# Patient Record
Sex: MALE | Race: WHITE | Employment: UNEMPLOYED | URBAN - METROPOLITAN AREA
[De-identification: names, ages, dates, MRNs, and addresses within clinical notes are randomized per-mention and may not be internally consistent; named-entity substitution may affect disease eponyms.]

---
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
## 2023-02-03 ENCOUNTER — TELEPHONE (OUTPATIENT)
Dept: ONCOLOGY | Facility: CLINIC | Age: 18
End: 2023-02-03
Payer: COMMERCIAL

## 2023-02-03 NOTE — TELEPHONE ENCOUNTER
Meka calling in to report that pt has not felt well in the past few days. She notes that he has complained of occasional dizziness, leg pain, and overall fatigue.    She states that it is not an urgent issue but she is wondering if he should have labs done sometime in the next week or so. Pt was last seen by Dr. Collier on 12/1/22 for polycythemia.    Pt currently taking aspirin 81 mg daily, has not tolerated phlebotomy in the past. Pt has had similar leg pains in the past per Meka. US was done on 11/2022 and was negative for DVT.    Pt denies fevers, shortness of breath, bleeding, nausea/vomiting, or chest pain.    Will route to provider for recommendations.

## 2023-02-03 NOTE — TELEPHONE ENCOUNTER
Writer called Meka to inform her of recommendations from Eugenio Lewis, PA  You; Asia Patrick PAYAN; America Henry, PARVIN 6 minutes ago (3:45 PM)     AW  Lets do labs and see JOSY early next week. Either Asia or JOANNE. Thanks       Meka stated that she is not currently with pt, and she would want to talk to him and her  before making the apts.    Meka to call clinic back to make both lab and provider apt for early next week.

## 2023-03-21 NOTE — PROGRESS NOTES
"Pediatric Endocrinology Follow-up Consultation: Diabetes    Patient: Gadiel James MRN# [id number]   YOB: 2005 Age: 17 year old   Date of Visit: 3/28/2023    Dear Rajesh Faustin (Inactive)    I had the pleasure of seeing your patient, Gadiel James in the Pediatric Endocrinology Clinic, [facility], on 3/28/2023 for a follow-up consultation of T2DM.  Gadiel was last seen in our clinic on 9/27/22.        Problem list:     Patient Active Problem List    Diagnosis Date Noted     Polycythemia 11/03/2022     Priority: Medium     Type 2 diabetes mellitus with complication, without long-term current use of insulin (H) 01/07/2019     Priority: Medium     [place]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 10/21/2016     Priority: Medium     Major depressive disorder, single episode, moderate (H) 06/17/2016     Priority: Medium     Attention deficit hyperactivity disorder (ADHD), combined type 06/16/2016     Priority: Medium     Back pain 03/09/2015     Priority: Medium     Pain in joint involving ankle and foot 03/09/2015     Priority: Medium     Severe obesity due to excess calories without serious comorbidity with body mass index (BMI) greater than 99th percentile for age in pediatric patient (H) 09/24/2014     Priority: Medium     Developmental reading disorder 06/09/2014     Priority: Medium     Anxiety 11/11/2011     Priority: Medium     Active autistic disorder 08/17/2010     Priority: Medium            HPI:   History was obtained from patient, patient's mother (because patient is unable to provide a complete history themselves) and electronic health record.     Gadiel, \"Kavin,\" is a 17 year old male diagnosed with type 2 diabetes on 12/28/2018.  Gadiel also has an extensive health history which includes: autism, ADHD, polycythemia, hypertriglyceridemia, low HDL, elevated transaminases, elevated LFT's and depression.   Gadiel is " accompanied by both parents today and returns for a follow-up after having last been seen by me on September 27, 2022.  At the conclusion of the last visit, the plan was to continue with diabetes medication dosing. Kavin reports that diabetes management has been going well, but he just came down with cold-like symptoms today. He denies any hospital visits. He denies any polyuria or polydipsia.    Mom reports that Kavin was seen by the hematology team in November 2022 and the juardiance was subsequently stopped due to a 3-4% risk of elevated hematocrit levels. Mom reports that fasting glucose levels have been as follows:  2/19 - 181   2/20 - 142   2/23 - 173   2/24 - 156   3/4 - 145   3/12 - 254, 175 (he was ill at the time)   3/13 - 164   3/18 - 168   3/26 - 136     We reviewed the following additional history at today's visit:  None    Today's concerns include: None    Blood Glucose Trends Recognized (Independent interpretation of glucose data): Persistent elevations in the fasting state.    Diet: Gadiel has no dietary restrictions.    Exercise: ad erika    I reviewed new history from the patient and the medical record.  I have reviewed previous lab results and records, patient BMI and the growth chart at today's visit.  I have reviewed the pump and sensor downloads today.    Blood Glucose Data:    As noted above    A1c:     Today s hemoglobin A1c: Not completed today.  Hemoglobin A1C   Date Value Ref Range Status   07/11/2022 7.2 (H) 0.0 - 5.6 % Final     Comment:     Normal <5.7%   Prediabetes 5.7-6.4%    Diabetes 6.5% or higher     Note: Adopted from ADA consensus guidelines.   06/28/2022 7.3 (A) 0.0 - 5.7 % Final      Result was discussed at today's visit.     Hemoglobin A1C   Date Value Ref Range Status   07/11/2022 7.2 (H) 0.0 - 5.6 % Final     Comment:     Normal <5.7%   Prediabetes 5.7-6.4%    Diabetes 6.5% or higher     Note: Adopted from ADA consensus guidelines.   06/28/2022 7.3 (A) 0.0 - 5.7 % Final    12/14/2021 7.0 (A) 0.0 - 5.7 % Final   09/07/2021 6.0 (H) 0.0 - 5.6 % Final     Comment:     Normal <5.7%   Prediabetes 5.7-6.4%    Diabetes 6.5% or higher     Note: Adopted from ADA consensus guidelines.   01/18/2021 8.6 (H) 0 - 5.6 % Final     Comment:     Results confirmed by repeat test  Normal <5.7% Prediabetes 5.7-6.4%  Diabetes 6.5% or higher - adopted from ADA   consensus guidelines.       Hemoglobin A1C POCT   Date Value Ref Range Status   09/27/2022 7.6 4.3 - <5.7 % Final       Current diabetes regimen:   Ozempic 2 mg once weekly  Metformin ER 2 pills at dinner            Social History:     Social History     Social History Narrative    7/21/2020: Kavin lives with his parents. He's an only child. He's going into 11th grade in the fall. He likes [employer], gardening, cooking, and video games. 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         11/17/2020: Kavin lives with his parents. He's in 11th grade and is doing online schooling now due to the COVID-19 pandemic. He is not happy with it. He's getting a 3 D printer and wants a camping oven top for [employer].         1/19/2021: Kavin lives with his parents in [place], [place]. He's in 11th grade (online schooling due to the COVID-19 pandemic, although he will be going back to clinic next week). He got his 3 D printer ([employer]).        3/23/21: Kavin is adopted, so family history is unknown. He is in the 11th grade for the 2156-4676 school year. He has been enjoying his 3D printer.         12/14/21: Kavin is living at home with adoptive parents.        2/8/22: Kavin is homebound for school at this time.        3/28/23: Kavin is home schooled            Family History:     Family History   Adopted: Yes   Problem Relation Age of Onset     Family History Negative Mother      Family History Negative Father        Family history was reviewed and is unchanged. Refer to the initial note.         Allergies:     Allergies   Allergen Reactions     Ritalin [Methylphenidate Hcl] Other (See Comments)      hallucinations             Medications:     Current Outpatient Medications   Medication Sig Dispense Refill     blood glucose (ACCU-CHEK GUIDE) test strip Use to test blood sugar 5 times daily or as directed. 150 each 6     blood glucose monitoring (ACCU-CHEK FASTCLIX) lancets Use to test blood sugar 3 times daily or as directed. 102 each 3     busPIRone (BUSPAR) 10 MG tablet Take 10 mg by mouth daily       escitalopram (LEXAPRO) 20 MG tablet Take 20 mg by mouth daily        guanFACINE HCl (INTUNIV) 3 MG TB24 24 hr tablet Take 3 mg by mouth At Bedtime        LATUDA 80 MG TABS tablet        metFORMIN (GLUCOPHAGE-XR) 750 MG 24 hr tablet Take 2 tablets (1,500 mg) by mouth daily (with dinner) 60 tablet 6     Pediatric Multivitamins-Iron (CHILDRENS MULTI VITAMINS/IRON PO) Take 1 tablet by mouth daily        risperiDONE (RISPERDAL) 1 MG tablet Take 1 mg by mouth 2 times daily        Semaglutide, 2 MG/DOSE, (OZEMPIC, 2 MG/DOSE,) 8 MG/3ML pen Inject 2 mg Subcutaneous every 7 days 3 mL 6     vitamin D3 (CHOLECALCIFEROL) 50 mcg (2000 units) tablet Take 1 tablet by mouth daily       Semaglutide, 1 MG/DOSE, 4 MG/3ML SOPN Inject 1 mg Subcutaneous every 7 days (Patient not taking: Reported on 3/28/2023) 3 mL 6     traZODone (DESYREL) 50 MG tablet Take  mg by mouth At Bedtime  (Patient not taking: Reported on 11/3/2022)               Review of Systems:     A comprehensive review of systems was performed and was negative, unless otherwise stated in HPI above.         Physical Exam:   There were no vitals taken for this visit.  Blood pressure percentiles are not available for patients who are 18 years or older.  Height: Data Unavailable, No height on file for this encounter.  Weight: 0 lbs 0 oz, No weight on file for this encounter.  BMI: There is no height or weight on file to calculate BMI., No height and weight on file for this encounter.      CONSTITUTIONAL:   Awake, alert, and in no apparent distress.  HEAD:  Normocephalic, without obvious abnormality.  EYES: Lids and lashes normal, sclera clear, conjunctiva normal.  LUNGS: No increased work of breathin  NEUROLOGIC: No focal deficits noted.   PSYCHIATRIC: Cooperative, no agitation.  MUSCULOSKELETAL:  Full range of motion noted.  Motor strength and tone are normal.         Diabetes Health Maintenance:   Date of Diabetes Diagnosis:  12/28/2018      Antibodies done (yes/no):    If Yes, Antibody Results: No results found for: INAB, IA2ABY, IA2A, GLTA, ISCAB, [id number], [id number], INSABRIA  Special Notes (if any):     Dates of Episodes DKA (month/year, cumulative excluding diagnosis, ongoing, assess each visit): None  Dates of Episodes Severe* Hypoglycemia (month/year, cumulative, ongoing, assess each visit): None   *Severe=patient unconscious, seizure, unable to help self    Date Last Saw Dietitian:     Date Last Eye Exam: 8/2020  Patient Report or Letter?  Report  Location of Eye Exam:   Date Last Flu Shot (or refused): 10/2020    Date Last Annual Lab Studies:   IgA Deficient (yes/no, date screened): No results found for: IGA  Celiac Screen (annual): No results found for: TTG  Thyroid (every 2 years):   TSH   Date Value Ref Range Status   07/11/2022 2.05 0.40 - 4.00 mU/L Final   01/18/2021 2.61 0.40 - 4.00 mU/L Final     T4 Free   Date Value Ref Range Status   01/18/2021 1.04 0.76 - 1.46 ng/dL Final     Free T4   Date Value Ref Range Status   07/11/2022 1.02 0.76 - 1.46 ng/dL Final     Lipids (every 5 years age 10 and older):   Cholesterol   Date Value Ref Range Status   07/11/2022 172 (H) <170 mg/dL Final   01/18/2021 167 <170 mg/dL Final     Triglycerides   Date Value Ref Range Status   07/11/2022 390 (H) <90 mg/dL Final   01/18/2021 427 (H) <90 mg/dL Final     Comment:     Borderline high:   mg/dl  High:            >129 mg/dl  Fasting specimen       HDL Cholesterol   Date Value Ref Range Status   01/18/2021 38 (L) >45 mg/dL Final     Comment:     Low:             <40  mg/dl  Borderline low:   40-45 mg/dl       Direct Measure HDL   Date Value Ref Range Status   07/11/2022 34 (L) >=40 mg/dL Final     LDL Cholesterol Calculated   Date Value Ref Range Status   07/11/2022 60 <=110 mg/dL Final   01/18/2021  <110 mg/dL Final    Cannot estimate LDL when triglyceride exceeds 400 mg/dL     Cholesterol/HDL Ratio   Date Value Ref Range Status   03/07/2015 2.4 0.0 - 5.0 Final     Non HDL Cholesterol   Date Value Ref Range Status   07/11/2022 138 (H) <120 mg/dL Final   01/18/2021 129 (H) <120 mg/dL Final     Comment:     Borderline high:  120-144 mg/dl  High:            >144 mg/dl       Urine Microalbumin (annual): No results found for: MICROALB, CREATCONC, MICROALBUMIN    Missed days of school related to diabetes concerns (illness, hypoglycemia, parental worry since last visit due to DM, excluding routine medical visits): None    Mental Health:    Today's PHQ-2 Mental Health Survey Score (every visit age 10 and older depression screening):  PHQ-2 Score:     PHQ-2 ( 1999 Pfizer) 3/28/2023 8/20/2019   Q1: Little interest or pleasure in doing things 0 1   Q2: Feeling down, depressed or hopeless 1 1   PHQ-2 Score 1 2   PHQ-2 Total Score (12-17 Years)- Positive if 3 or more points; Administer PHQ-A if positive - 2        PHQ-9 score:    PHQ 12/14/2021   PHQ-9 Total Score 7   Q9: Thoughts of better off dead/self-harm past 2 weeks Several days   PHQ-A Total Score -   PHQ-A Depressed most days in past year -   PHQ-A Mood affect on daily activities -   PHQ-A Suicide Ideation past 2 weeks -   PHQ-A Suicide Ideation past month -   PHQ-A Previous suicide attempt -             Laboratory results:     Albumin Urine mg/L   Date Value Ref Range Status   09/07/2021 6 mg/L Final          Lab on 11/20/2022   Component Date Value Ref Range Status     WBC Count 11/20/2022 7.4  4.0 - 11.0 10e3/uL Final     RBC Count 11/20/2022 5.58  4.40 - 5.90 10e6/uL Final     Hemoglobin 11/20/2022 17.7  13.3 - 17.7 g/dL Final      Hematocrit 11/20/2022 50.6  40.0 - 53.0 % Final     MCV 11/20/2022 91  78 - 100 fL Final     MCH 11/20/2022 31.7  26.5 - 33.0 pg Final     MCHC 11/20/2022 35.0  31.5 - 36.5 g/dL Final     RDW 11/20/2022 12.3  10.0 - 15.0 % Final     Platelet Count 11/20/2022 266  150 - 450 10e3/uL Final     % Neutrophils 11/20/2022 39  % Final     % Lymphocytes 11/20/2022 45  % Final     % Monocytes 11/20/2022 7  % Final     % Eosinophils 11/20/2022 8  % Final     % Basophils 11/20/2022 1  % Final     % Immature Granulocytes 11/20/2022 0  % Final     NRBCs per 100 WBC 11/20/2022 0  <1 /100 Final     Absolute Neutrophils 11/20/2022 2.9  1.6 - 8.3 10e3/uL Final     Absolute Lymphocytes 11/20/2022 3.3  0.8 - 5.3 10e3/uL Final     Absolute Monocytes 11/20/2022 0.5  0.0 - 1.3 10e3/uL Final     Absolute Eosinophils 11/20/2022 0.6  0.0 - 0.7 10e3/uL Final     Absolute Basophils 11/20/2022 0.0  0.0 - 0.2 10e3/uL Final     Absolute Immature Granulocytes 11/20/2022 0.0  <=0.4 10e3/uL Final     Absolute NRBCs 11/20/2022 0.0  10e3/uL Final   Oncology Visit on 11/03/2022   Component Date Value Ref Range Status     Sodium 11/03/2022 137  136 - 145 mmol/L Final     Potassium 11/03/2022 4.2  3.4 - 5.3 mmol/L Final     Chloride 11/03/2022 102  98 - 107 mmol/L Final     Carbon Dioxide (CO2) 11/03/2022 22  22 - 29 mmol/L Final     Anion Gap 11/03/2022 13  7 - 15 mmol/L Final     Urea Nitrogen 11/03/2022 10.1  6.0 - 20.0 mg/dL Final     Creatinine 11/03/2022 0.61 (L)  0.67 - 1.17 mg/dL Final     Calcium 11/03/2022 9.6  8.6 - 10.0 mg/dL Final     Glucose 11/03/2022 162 (H)  70 - 99 mg/dL Final     Alkaline Phosphatase 11/03/2022 95  40 - 129 U/L Final     AST 11/03/2022 65 (H)  10 - 50 U/L Final     ALT 11/03/2022 106 (H)  10 - 50 U/L Final     Protein Total 11/03/2022 7.3  6.4 - 8.3 g/dL Final     Albumin 11/03/2022 4.5  3.5 - 5.2 g/dL Final     Bilirubin Total 11/03/2022 0.6  <=1.2 mg/dL Final     GFR Estimate 11/03/2022 >90  >60 mL/min/1.73m2  Final    Effective December 21, 2021 eGFRcr in adults is calculated using the 2021 CKD-EPI creatinine equation which includes age and gender (Maik et al., NEJM, DOI: 10.1056/FKSRrh3854271)     Ferritin 11/03/2022 317  31 - 409 ng/mL Final     Iron 11/03/2022 171 (H)  61 - 157 ug/dL Final     Iron Sat Index 11/03/2022 46  15 - 46 % Final     Iron Binding Capacity 11/03/2022 372  240 - 430 ug/dL Final     Lactate Dehydrogenase 11/03/2022 154  0 - 250 U/L Final     Interpretation 11/03/2022    Final                    Value:This result contains rich text formatting which cannot be displayed here.     Significant Results 11/03/2022    Final                    Value:This result contains rich text formatting which cannot be displayed here.     Test Details 11/03/2022    Final                    Value:This result contains rich text formatting which cannot be displayed here.     Specimen Description 11/03/2022    Final                    Value:This result contains rich text formatting which cannot be displayed here.     Final Diagnosis 11/03/2022    Final                    Value:This result contains rich text formatting which cannot be displayed here.     Comment 11/03/2022    Final                    Value:This result contains rich text formatting which cannot be displayed here.     Clinical Information 11/03/2022    Final                    Value:This result contains rich text formatting which cannot be displayed here.     Peripheral Smear 11/03/2022    Final                    Value:This result contains rich text formatting which cannot be displayed here.     Peripheral Hematologic Data 11/03/2022    Final                    Value:This result contains rich text formatting which cannot be displayed here.     Performing Labs 11/03/2022    Final                    Value:This result contains rich text formatting which cannot be displayed here.     WBC Count 11/03/2022 7.5  4.0 - 11.0 10e3/uL Final     RBC Count 11/03/2022  5.82  4.40 - 5.90 10e6/uL Final     Hemoglobin 11/03/2022 18.0 (H)  13.3 - 17.7 g/dL Final     Hematocrit 11/03/2022 52.6  40.0 - 53.0 % Final     MCV 11/03/2022 90  78 - 100 fL Final     MCH 11/03/2022 30.9  26.5 - 33.0 pg Final     MCHC 11/03/2022 34.2  31.5 - 36.5 g/dL Final     RDW 11/03/2022 12.6  10.0 - 15.0 % Final     Platelet Count 11/03/2022 262  150 - 450 10e3/uL Final     % Neutrophils 11/03/2022 44  % Final     % Lymphocytes 11/03/2022 42  % Final     % Monocytes 11/03/2022 7  % Final     % Eosinophils 11/03/2022 6  % Final     % Basophils 11/03/2022 1  % Final     % Immature Granulocytes 11/03/2022 0  % Final     NRBCs per 100 WBC 11/03/2022 0  <1 /100 Final     Absolute Neutrophils 11/03/2022 3.3  1.6 - 8.3 10e3/uL Final     Absolute Lymphocytes 11/03/2022 3.1  0.8 - 5.3 10e3/uL Final     Absolute Monocytes 11/03/2022 0.5  0.0 - 1.3 10e3/uL Final     Absolute Eosinophils 11/03/2022 0.4  0.0 - 0.7 10e3/uL Final     Absolute Basophils 11/03/2022 0.1  0.0 - 0.2 10e3/uL Final     Absolute Immature Granulocytes 11/03/2022 0.0  <=0.4 10e3/uL Final     Absolute NRBCs 11/03/2022 0.0  10e3/uL Final     % Reticulocyte 11/03/2022 1.6  0.5 - 2.0 % Final     Absolute Reticulocyte 11/03/2022 0.094  0.025 - 0.095 10e6/uL Final     Erythropoietin 11/03/2022 12  4 - 27 mU/mL Final    Comment: INTERPRETIVE INFORMATION: Erythropoietin  Normal serum concentrations of erythropoietin for 95% of   individuals with normal hematocrits range from 4-27 mU/mL.    As the hematocrit is lowered by iron deficiency, aplastic,   or hemolytic anemia, the concentration of erythropoietin   increases as shown in the graph below. In the absence of   anemia, elevated concentrations are seen in renal tumors,   as a manifestation of renal transplant rejection, and in   secondary polycythemia. Low values may be observed in   hemochromatosis.          Expected Erythropoietin Concentrations in Patients                     with Uncomplicated  Anemia       Erythropoietin (mU/mL)                100,000 - +                          +                 10,000 - +.......                          + .......                  1,000 - +    .......                          +     ........                    100 - +       ........                          +        ........                     10 - +                                     ........                          +---+---+---+---+---+---+                         10   20  30  40  50  60  70                                (Hematocrit %)              (Contributions To Nephrology 1988:66:54-62)    Decreased erythropoietin concentrations with an elevated   hematocrit are observed in patients with polycythemia rubra   vera, and with a decreased hematocrit in patients with HIV   infection who are receiving AZT.  Patients on AZT who have   anemia and erythropoietin concentrations of less than or   equal to 500 mU/mL may benefit from therapy with   recombinant EPO ([facility] [id number],1990).  Performed By: [facility]  [address] [place] [id number]  : Myke Foster MD, PhD   Office Visit on 09/27/2022   Component Date Value Ref Range Status     Hemoglobin A1C POCT 09/27/2022 7.6  4.3 - <5.7 % Final   Lab on 09/13/2022   Component Date Value Ref Range Status     CK 09/13/2022 49  39 - 308 U/L Final     Vitamin B12 09/13/2022 683  232 - 1,245 pg/mL Final     Sodium 09/13/2022 138  136 - 145 mmol/L Final     Potassium 09/13/2022 3.8  3.4 - 5.3 mmol/L Final     Chloride 09/13/2022 102  98 - 107 mmol/L Final     Carbon Dioxide (CO2) 09/13/2022 20 (L)  22 - 29 mmol/L Final     Anion Gap 09/13/2022 16 (H)  7 - 15 mmol/L Final     Urea Nitrogen 09/13/2022 7.7  6.0 - 20.0 mg/dL Final     Creatinine 09/13/2022 0.68  0.67 - 1.17 mg/dL Final     Calcium 09/13/2022 9.5  8.6 - 10.0 mg/dL Final     Glucose 09/13/2022 226 (H)  70 - 99 mg/dL Final     Alkaline Phosphatase 09/13/2022 100  40 - 574  U/L Final     AST 09/13/2022 49  10 - 50 U/L Final     ALT 09/13/2022 95 (H)  10 - 50 U/L Final     Protein Total 09/13/2022 7.9  6.4 - 8.3 g/dL Final     Albumin 09/13/2022 4.7  3.5 - 5.2 g/dL Final     Bilirubin Total 09/13/2022 0.6  <=1.2 mg/dL Final     GFR Estimate 09/13/2022 >90  >60 mL/min/1.73m2 Final    Effective December 21, 2021 eGFRcr in adults is calculated using the 2021 CKD-EPI creatinine equation which includes age and gender (Maik et al., NE, DOI: 10.1056/BLSThn6279396)     WBC Count 09/13/2022 7.0  4.0 - 11.0 10e3/uL Final     RBC Count 09/13/2022 6.22 (H)  4.40 - 5.90 10e6/uL Final     Hemoglobin 09/13/2022 19.4 (H)  13.3 - 17.7 g/dL Final     Hematocrit 09/13/2022 57.5 (H)  40.0 - 53.0 % Final     MCV 09/13/2022 92  78 - 100 fL Final     MCH 09/13/2022 31.2  26.5 - 33.0 pg Final     MCHC 09/13/2022 33.7  31.5 - 36.5 g/dL Final     RDW 09/13/2022 12.4  10.0 - 15.0 % Final     Platelet Count 09/13/2022 288  150 - 450 10e3/uL Final     % Neutrophils 09/13/2022 47  % Final     % Lymphocytes 09/13/2022 38  % Final     % Monocytes 09/13/2022 7  % Final     % Eosinophils 09/13/2022 7  % Final     % Basophils 09/13/2022 1  % Final     % Immature Granulocytes 09/13/2022 0  % Final     NRBCs per 100 WBC 09/13/2022 0  <1 /100 Final     Absolute Neutrophils 09/13/2022 3.3  1.6 - 8.3 10e3/uL Final     Absolute Lymphocytes 09/13/2022 2.6  0.8 - 5.3 10e3/uL Final     Absolute Monocytes 09/13/2022 0.5  0.0 - 1.3 10e3/uL Final     Absolute Eosinophils 09/13/2022 0.5  0.0 - 0.7 10e3/uL Final     Absolute Basophils 09/13/2022 0.1  0.0 - 0.2 10e3/uL Final     Absolute Immature Granulocytes 09/13/2022 0.0  <=0.4 10e3/uL Final     Absolute NRBCs 09/13/2022 0.0  10e3/uL Final   Lab on 07/11/2022   Component Date Value Ref Range Status     Bilirubin Total 07/11/2022 0.5  0.2 - 1.3 mg/dL Final     Bilirubin Direct 07/11/2022 0.1  0.0 - 0.2 mg/dL Final     Protein Total 07/11/2022 8.2  6.8 - 8.8 g/dL Final     Albumin  07/11/2022 4.3  3.4 - 5.0 g/dL Final     Alkaline Phosphatase 07/11/2022 95  65 - 260 U/L Final     AST 07/11/2022 48 (H)  0 - 35 U/L Final     ALT 07/11/2022 115 (H)  0 - 50 U/L Final     TSH 07/11/2022 2.05  0.40 - 4.00 mU/L Final     Free T4 07/11/2022 1.02  0.76 - 1.46 ng/dL Final     Cholesterol 07/11/2022 172 (H)  <170 mg/dL Final     Triglycerides 07/11/2022 390 (H)  <90 mg/dL Final     Direct Measure HDL 07/11/2022 34 (L)  >=40 mg/dL Final     LDL Cholesterol Calculated 07/11/2022 60  <=110 mg/dL Final     Non HDL Cholesterol 07/11/2022 138 (H)  <120 mg/dL Final     Patient Fasting > 8hrs? 07/11/2022 Yes   Final     Sodium 07/11/2022 135  133 - 144 mmol/L Final     Potassium 07/11/2022 4.1  3.4 - 5.3 mmol/L Final     Chloride 07/11/2022 108  98 - 110 mmol/L Final     Carbon Dioxide (CO2) 07/11/2022 18 (L)  20 - 32 mmol/L Final     Anion Gap 07/11/2022 9  3 - 14 mmol/L Final     Urea Nitrogen 07/11/2022 10  7 - 21 mg/dL Final     Creatinine 07/11/2022 0.52  0.50 - 1.00 mg/dL Final     Calcium 07/11/2022 9.0  8.5 - 10.1 mg/dL Final    Calcium results for 1-18 y reported between 07/11/2021 and 1/27/2022 were evaluated against an outdated reference interval of 9.1-10.3 mg/dL rather than the intended reference interval of 8.5-10.1 mg/dL which is more representative of our healthy pediatric population. The calcium value itself was accurate but may not have been flagged correctly due to the outdated reference interval.     Glucose 07/11/2022 151 (H)  70 - 99 mg/dL Final     GFR Estimate 07/11/2022 >90  >60 mL/min/1.73m2 Final    Effective December 21, 2021 eGFRcr in adults is calculated using the 2021 CKD-EPI creatinine equation which includes age and gender (Maik et al., NEJM, DOI: 10.1056/HVUEgd7465647)     Vitamin D, Total (25-Hydroxy) 07/11/2022 37  20 - 75 ug/L Final     Ferritin 07/11/2022 156  26 - 388 ng/mL Final     Hemoglobin A1C 07/11/2022 7.2 (H)  0.0 - 5.6 % Final    Normal <5.7%   Prediabetes  5.7-6.4%    Diabetes 6.5% or higher     Note: Adopted from ADA consensus guidelines.     WBC Count 07/11/2022 6.3  4.0 - 11.0 10e3/uL Final     RBC Count 07/11/2022 5.99 (H)  4.40 - 5.90 10e6/uL Final     Hemoglobin 07/11/2022 18.4 (H)  13.3 - 17.7 g/dL Final     Hematocrit 07/11/2022 53.4 (H)  40.0 - 53.0 % Final     MCV 07/11/2022 89  78 - 100 fL Final     MCH 07/11/2022 30.7  26.5 - 33.0 pg Final     MCHC 07/11/2022 34.5  31.5 - 36.5 g/dL Final     RDW 07/11/2022 12.8  10.0 - 15.0 % Final     Platelet Count 07/11/2022 286  150 - 450 10e3/uL Final     % Neutrophils 07/11/2022 46  % Final     % Lymphocytes 07/11/2022 39  % Final     % Monocytes 07/11/2022 8  % Final     % Eosinophils 07/11/2022 6  % Final     % Basophils 07/11/2022 1  % Final     % Immature Granulocytes 07/11/2022 0  % Final     NRBCs per 100 WBC 07/11/2022 0  <1 /100 Final     Absolute Neutrophils 07/11/2022 3.0  1.6 - 8.3 10e3/uL Final     Absolute Lymphocytes 07/11/2022 2.5  0.8 - 5.3 10e3/uL Final     Absolute Monocytes 07/11/2022 0.5  0.0 - 1.3 10e3/uL Final     Absolute Eosinophils 07/11/2022 0.4  0.0 - 0.7 10e3/uL Final     Absolute Basophils 07/11/2022 0.0  0.0 - 0.2 10e3/uL Final     Absolute Immature Granulocytes 07/11/2022 0.0  <=0.4 10e3/uL Final     Absolute NRBCs 07/11/2022 0.0  10e3/uL Final   Office Visit on 06/28/2022   Component Date Value Ref Range Status     Hemoglobin A1C 06/28/2022 7.3 (A)  0.0 - 5.7 % Final            Assessment and Plan:   Gadiel is a 17 year old male with Type 2 diabetes mellitus.  Glucose control is not at goal as evidenced by hyperglycemia occurring 100% of the time during the fasting state. We reviewed glucose target goals upon waking (<126). I've asked parents to obtain additional glucose information - pre and post dinner to better assess need for insulin at this time. No medication changes at this time. Please refer to patient instructions for plan.    Diabetes Screening:  Thyroid (every 2  years): Due   Lipids (every 5 years age 10 and older): Due   Urine Microalbumin (annual): Due     Patient Instructions     It was nice to see you in clinic today.    -Please check some pre-dinner and 2 hours post-dinner fingerstick glucose levels  -Please call the educators with results  -Hgb A1c to be completed  -Continue with Ozempic 2 mg once weekly  -Continue with Metformin ER 2 pills at dinner once daily   -Dr. Sood's team to reach out regarding a T2DM research study    Hemoglobin A1c  American Diabetes Association Goal A1c is <7.5%.   Your Most Recent A1c was:  Hemoglobin A1C   Date Value Ref Range Status   2022 7.2 (H) 0.0 - 5.6 % Final     Comment:     Normal <5.7%   Prediabetes 5.7-6.4%    Diabetes 6.5% or higher     Note: Adopted from ADA consensus guidelines.   2022 7.3 (A) 0.0 - 5.7 % Final               Scheduling:    Pediatric Call Center [phone], option 1.    After Hours Emergency:  [phone].  Ask for the on-call doctor for pediatric endocrinology to be paged.    Diabetes nurses can be reached at [phone].  Main Office: [phone]  Fax: [phone]    Medication renewal requests must be faxed to the main office by your pharmacy.  Allow 3-4 days for completion.              Diabetes is a complicated and dangerous illness which requires intensive monitoring and treatment to prevent both short-term and long-term consequences to various organs. Inadequate management has an increased potential for serious long term effects on various organs, thus patients require intensive monitoring of therapy for safety and efficacy. While insulin therapy is life-saving, it is also associated with risks, such as life-threatening toxicity (hypoglycemia). Careful and continuous attention to balancing glucose levels, activity, diet and insul dosage is necessary.       Thank you for allowing me to participate in the care of your patient.  Please do not hesitate to call  with questions or concerns.      Sincerely,  Nida Angel, MSN, CPNP, [facility]  Pediatric Nurse Practitioner  [facility]  Pediatric Enocrinology    CC      Copy to patient  Meka Rossi (CO GUARDIAN TO 8-) Landon Smith (CO GUARDIAN TO 8-)  [address] [place] [address]    Parents and child completed the video visit from home and the provider completed from the clinic.  Start of video visit: 1:42PM and End of visit: 1:53PM     I spent a total of 30 minutes on the date of the encounter in chart review, patient visit and documentation. Please see the note for further information on patient assessment and treatment.

## 2023-03-28 ENCOUNTER — VIRTUAL VISIT (OUTPATIENT)
Dept: PEDIATRICS | Facility: CLINIC | Age: 18
End: 2023-03-28
Attending: NURSE PRACTITIONER
Payer: COMMERCIAL

## 2023-03-28 VITALS — HEIGHT: 74 IN | BODY MASS INDEX: 34.78 KG/M2 | WEIGHT: 271 LBS

## 2023-03-28 DIAGNOSIS — E11.9 TYPE 2 DIABETES MELLITUS WITHOUT COMPLICATION, WITHOUT LONG-TERM CURRENT USE OF INSULIN (H): Primary | ICD-10-CM

## 2023-03-28 DIAGNOSIS — F84.0 ACTIVE AUTISTIC DISORDER: ICD-10-CM

## 2023-03-28 DIAGNOSIS — E66.01 SEVERE OBESITY DUE TO EXCESS CALORIES WITHOUT SERIOUS COMORBIDITY WITH BODY MASS INDEX (BMI) GREATER THAN 99TH PERCENTILE FOR AGE IN PEDIATRIC PATIENT (H): ICD-10-CM

## 2023-03-28 PROCEDURE — 99214 OFFICE O/P EST MOD 30 MIN: CPT | Mod: VID | Performed by: NURSE PRACTITIONER

## 2023-03-28 RX ORDER — METFORMIN HYDROCHLORIDE 750 MG/1
1500 TABLET, EXTENDED RELEASE ORAL
Qty: 60 TABLET | Refills: 6 | Status: SHIPPED | OUTPATIENT
Start: 2023-03-28 | End: 2023-05-09

## 2023-03-28 RX ORDER — SEMAGLUTIDE 2.68 MG/ML
2 INJECTION, SOLUTION SUBCUTANEOUS
Qty: 3 ML | Refills: 6 | Status: SHIPPED | OUTPATIENT
Start: 2023-03-28 | End: 2023-10-24

## 2023-03-28 ASSESSMENT — PAIN SCALES - GENERAL: PAINLEVEL: NO PAIN (0)

## 2023-03-28 NOTE — PATIENT INSTRUCTIONS
It was nice to see you in clinic today.    -Please check some pre-dinner and 2 hours post-dinner fingerstick glucose levels  -Please call the educators with results  -Hgb A1c to be completed  -Continue with Ozempic 2 mg once weekly  -Continue with Metformin ER 2 pills at dinner once daily   -Dr. Sood's team to reach out regarding a T2DM research study    Hemoglobin A1c  American Diabetes Association Goal A1c is <7.5%.   Your Most Recent A1c was:  Hemoglobin A1C   Date Value Ref Range Status   2022 7.2 (H) 0.0 - 5.6 % Final     Comment:     Normal <5.7%   Prediabetes 5.7-6.4%    Diabetes 6.5% or higher     Note: Adopted from ADA consensus guidelines.   2022 7.3 (A) 0.0 - 5.7 % Final               Scheduling:    Pediatric Call Center [phone], option 1.    After Hours Emergency:  [phone].  Ask for the on-call doctor for pediatric endocrinology to be paged.    Diabetes nurses can be reached at [phone].  Main Office: [phone]  Fax: [phone]    Medication renewal requests must be faxed to the main office by your pharmacy.  Allow 3-4 days for completion.

## 2023-03-28 NOTE — NURSING NOTE
Is the patient currently in the state of [place]? YES    Visit mode:VIDEO    If the visit is dropped, the patient can be reconnected by: VIDEO VISIT: Text to cell phone: [phone]    Will anyone else be joining the visit? NO      How would you like to obtain your AVS? Mail a copy    Are changes needed to the allergy or medication list? YES: Please remove meds marked not taking.    Reason for visit:   Weight from today.  Pt has a cold.  Feeling down, depressed, hopeless 1 x per month.  Chief Complaint   Patient presents with     Video Visit     Sarah Cottrell

## 2023-04-03 ENCOUNTER — TELEPHONE (OUTPATIENT)
Dept: INTERNAL MEDICINE | Facility: CLINIC | Age: 18
End: 2023-04-03
Payer: COMMERCIAL

## 2023-04-03 NOTE — TELEPHONE ENCOUNTER
Patient's mom (legal guardian) asks which medication patient was directed to stop - Jardiance or Januvia. Informed mom that it was Jardiance that was stopped.

## 2023-05-09 ENCOUNTER — TELEPHONE (OUTPATIENT)
Dept: PEDIATRICS | Facility: CLINIC | Age: 18
End: 2023-05-09
Payer: COMMERCIAL

## 2023-05-09 DIAGNOSIS — E11.9 TYPE 2 DIABETES MELLITUS WITHOUT COMPLICATION, WITHOUT LONG-TERM CURRENT USE OF INSULIN (H): Primary | ICD-10-CM

## 2023-05-09 DIAGNOSIS — E11.9 TYPE 2 DIABETES MELLITUS WITHOUT COMPLICATION, WITHOUT LONG-TERM CURRENT USE OF INSULIN (H): ICD-10-CM

## 2023-05-09 RX ORDER — METFORMIN HCL 500 MG
2000 TABLET, EXTENDED RELEASE 24 HR ORAL
Qty: 360 TABLET | Refills: 3 | Status: SHIPPED | OUTPATIENT
Start: 2023-05-09 | End: 2024-06-03

## 2023-05-09 RX ORDER — METFORMIN HCL 500 MG
2000 TABLET, EXTENDED RELEASE 24 HR ORAL
Qty: 120 TABLET | Refills: 6 | Status: SHIPPED | OUTPATIENT
Start: 2023-05-09 | End: 2023-05-09

## 2023-05-09 NOTE — TELEPHONE ENCOUNTER
The diabetes educator reached out with a message from mom:    Mom calling in with Bgs over the last couple weeks     5/7 ,    5/6AM 147   5/1    4/30 146   4/29 143   4/26 169   4/24              4/23    4/22 153   4/19pm 152   4/18    4/15            PM  154    Mom also mentioned that the pharmacist recommends that metformin be considered for Kavin as well. This NP reviewed glucose trends and left a VM for mom recommending that we consider the start of lantus (25 Units will be considered, however, the dose was not discussed with mom yet). This NP also questioned whether the pharmacist recommended a different T2DM medication as Kavin is already taking Metformin ER. Mom was asked to call back and discuss next steps regarding the addition of a basal insulin to assist with lowering overall glucose levels.    Nida Angel NP      Mom returned the message and would like to avoid the addition of lantus at this time. She notes that recent glucoses have been lower (117 on 5/7). She reports that the pharmacy request was to increase the metformin dose. This NP explained that the current metformin dose is maxed out with the two 750mg tablets. I will look into alternate metformin options to net a slightly higher dose of metformin (max 2000 mg/day). I encouraged increased activity and weight reduction to further assist with lowering glucose levels.      Update - We will order metformin 500mg ER tablets with the plan to take 4 per day (total 2000mg once daily). Mom agreed with the plan.    Nida Angel NP

## 2023-05-09 NOTE — TELEPHONE ENCOUNTER
Requsting 90 day prescription      Refill request received from: [facility]  Medication Requested: Metformin  mg 24 HR tablets  Directions:take 4 tablets by mouth daily wit dinner. Discontinue metformin  mg tablets  Quantity:360  Last Office Visit: 3/28/23  Next Appointment Scheduled for: not scheduled  Last refill: 5/9/2023  Sent To:  RN or Provider

## 2023-05-11 ENCOUNTER — HOSPITAL ENCOUNTER (EMERGENCY)
Facility: CLINIC | Age: 18
Discharge: HOME | End: 2023-05-12
Attending: EMERGENCY MEDICINE | Admitting: EMERGENCY MEDICINE
Payer: COMMERCIAL

## 2023-05-11 DIAGNOSIS — F43.0 ACUTE REACTION TO STRESS: ICD-10-CM

## 2023-05-11 DIAGNOSIS — T50.902A OVERDOSE, INTENTIONAL SELF-HARM, INITIAL ENCOUNTER (H): ICD-10-CM

## 2023-05-11 LAB
ANION GAP SERPL CALCULATED.3IONS-SCNC: 12 MMOL/L (ref 7–15)
APAP SERPL-MCNC: <5 UG/ML (ref 10–30)
BASOPHILS # BLD AUTO: 0 10E3/UL (ref 0–0.2)
BASOPHILS NFR BLD AUTO: 1 %
BUN SERPL-MCNC: 8.1 MG/DL (ref 6–20)
CALCIUM SERPL-MCNC: 9.7 MG/DL (ref 8.6–10)
CHLORIDE SERPL-SCNC: 105 MMOL/L (ref 98–107)
CREAT SERPL-MCNC: 0.69 MG/DL (ref 0.67–1.17)
DEPRECATED HCO3 PLAS-SCNC: 24 MMOL/L (ref 22–29)
EOSINOPHIL # BLD AUTO: 0.3 10E3/UL (ref 0–0.7)
EOSINOPHIL NFR BLD AUTO: 5 %
ERYTHROCYTE [DISTWIDTH] IN BLOOD BY AUTOMATED COUNT: 12.4 % (ref 10–15)
GFR SERPL CREATININE-BSD FRML MDRD: >90 ML/MIN/1.73M2
GLUCOSE SERPL-MCNC: 166 MG/DL (ref 70–99)
HCT VFR BLD AUTO: 47.3 % (ref 40–53)
HGB BLD-MCNC: 16.6 G/DL (ref 13.3–17.7)
HOLD SPECIMEN: NORMAL
IMM GRANULOCYTES # BLD: 0 10E3/UL
IMM GRANULOCYTES NFR BLD: 0 %
LYMPHOCYTES # BLD AUTO: 2.5 10E3/UL (ref 0.8–5.3)
LYMPHOCYTES NFR BLD AUTO: 39 %
MAGNESIUM SERPL-MCNC: 1.9 MG/DL (ref 1.7–2.3)
MCH RBC QN AUTO: 31.4 PG (ref 26.5–33)
MCHC RBC AUTO-ENTMCNC: 35.1 G/DL (ref 31.5–36.5)
MCV RBC AUTO: 89 FL (ref 78–100)
MONOCYTES # BLD AUTO: 0.5 10E3/UL (ref 0–1.3)
MONOCYTES NFR BLD AUTO: 7 %
NEUTROPHILS # BLD AUTO: 3.1 10E3/UL (ref 1.6–8.3)
NEUTROPHILS NFR BLD AUTO: 48 %
NRBC # BLD AUTO: 0 10E3/UL
NRBC BLD AUTO-RTO: 0 /100
PLATELET # BLD AUTO: 288 10E3/UL (ref 150–450)
POTASSIUM SERPL-SCNC: 4.1 MMOL/L (ref 3.4–5.3)
RBC # BLD AUTO: 5.29 10E6/UL (ref 4.4–5.9)
SALICYLATES SERPL-MCNC: <0.3 MG/DL
SODIUM SERPL-SCNC: 141 MMOL/L (ref 136–145)
TSH SERPL DL<=0.005 MIU/L-ACNC: 1.85 UIU/ML (ref 0.5–4.3)
WBC # BLD AUTO: 6.3 10E3/UL (ref 4–11)

## 2023-05-11 PROCEDURE — 83735 ASSAY OF MAGNESIUM: CPT | Performed by: EMERGENCY MEDICINE

## 2023-05-11 PROCEDURE — 84443 ASSAY THYROID STIM HORMONE: CPT | Performed by: EMERGENCY MEDICINE

## 2023-05-11 PROCEDURE — 85025 COMPLETE CBC W/AUTO DIFF WBC: CPT | Performed by: EMERGENCY MEDICINE

## 2023-05-11 PROCEDURE — 80179 DRUG ASSAY SALICYLATE: CPT | Performed by: EMERGENCY MEDICINE

## 2023-05-11 PROCEDURE — 250N000013 HC RX MED GY IP 250 OP 250 PS 637: Performed by: EMERGENCY MEDICINE

## 2023-05-11 PROCEDURE — 90791 PSYCH DIAGNOSTIC EVALUATION: CPT

## 2023-05-11 PROCEDURE — 250N000011 HC RX IP 250 OP 636: Performed by: EMERGENCY MEDICINE

## 2023-05-11 PROCEDURE — 96374 THER/PROPH/DIAG INJ IV PUSH: CPT

## 2023-05-11 PROCEDURE — 82310 ASSAY OF CALCIUM: CPT | Performed by: EMERGENCY MEDICINE

## 2023-05-11 PROCEDURE — 80143 DRUG ASSAY ACETAMINOPHEN: CPT | Performed by: EMERGENCY MEDICINE

## 2023-05-11 PROCEDURE — 36415 COLL VENOUS BLD VENIPUNCTURE: CPT | Performed by: EMERGENCY MEDICINE

## 2023-05-11 PROCEDURE — 93005 ELECTROCARDIOGRAM TRACING: CPT

## 2023-05-11 PROCEDURE — 99285 EMERGENCY DEPT VISIT HI MDM: CPT | Mod: 25

## 2023-05-11 RX ORDER — ONDANSETRON 2 MG/ML
4 INJECTION INTRAMUSCULAR; INTRAVENOUS ONCE
Status: COMPLETED | OUTPATIENT
Start: 2023-05-11 | End: 2023-05-11

## 2023-05-11 RX ADMIN — ONDANSETRON 4 MG: 2 INJECTION INTRAMUSCULAR; INTRAVENOUS at 17:16

## 2023-05-11 RX ADMIN — POISON TREATMENT ADSORBENT 50 G: 50 SUSPENSION ORAL at 17:19

## 2023-05-11 ASSESSMENT — COLUMBIA-SUICIDE SEVERITY RATING SCALE - C-SSRS
2. HAVE YOU ACTUALLY HAD ANY THOUGHTS OF KILLING YOURSELF?: NO
1. HAVE YOU WISHED YOU WERE DEAD OR WISHED YOU COULD GO TO SLEEP AND NOT WAKE UP?: YES
TOTAL  NUMBER OF INTERRUPTED ATTEMPTS LIFETIME: NO
6. HAVE YOU EVER DONE ANYTHING, STARTED TO DO ANYTHING, OR PREPARED TO DO ANYTHING TO END YOUR LIFE?: NO
TOTAL  NUMBER OF ABORTED OR SELF INTERRUPTED ATTEMPTS LIFETIME: NO
ATTEMPT LIFETIME: NO
REASONS FOR IDEATION PAST MONTH: COMPLETELY TO GET ATTENTION, REVENGE, OR A REACTION FROM OTHERS
REASONS FOR IDEATION LIFETIME: COMPLETELY TO GET ATTENTION, REVENGE, OR A REACTION FROM OTHERS
1. IN THE PAST MONTH, HAVE YOU WISHED YOU WERE DEAD OR WISHED YOU COULD GO TO SLEEP AND NOT WAKE UP?: YES

## 2023-05-11 ASSESSMENT — ACTIVITIES OF DAILY LIVING (ADL)
ADLS_ACUITY_SCORE: 35

## 2023-05-11 NOTE — PHARMACY-CONSULT NOTE
"Pharmacy Poison Control Note     Gaidel James is a 17 year old male presenting to the emergency department with mixed ingestion. At 1556, patient ingested an unknown number of his mother's propranolol 40 mg tablets, spironolactone 25 mg, levothyroxine 125 mcg and one other unknown medication.  He had access to extra strength acetaminophen, melatonin, methocarbamol, topiramate, lurasidone, risperidone, and Unisom. Patient took \"two handfuls\" of medication and estimates this to be ~30-50 tablets. After ingestion, the patient forced emesis and found 6-7 pill fragments in the emesis.     Mom states that she was low on propranolol (~10 tablets left) and levothyroxine (~5 tablets left), but likely had more spironolactone (takes prn and only gets #30 tablets at a time).    He is currently awake, alert and able to answer questions with a flat affect at times. His QRS is slightly elevated at 118, otherwise ECG is within normal limits. Poison Control was contacted by the ED pharmacist with recommendations below.      Is this a concerning ingestion? Potentially    Concerning symptoms: Seizures, hypotension, and bradycardia from propranolol, hypotension from methocarbamol, risperidone, and spironolactone, appearing 2-6 hours post-ingestion     Monitoring parameters: Potassium and magnesium, a 4 hour post-ingestion acetaminophen level, salicylate level, telemetry, blood pressure     Treatment: Activated charcoal since ~1 hour post-ingestion, ceasing if patient becomes groggy or is unable to tolerate. Fluids for hypotension, if hypotension fails to respond to fluids, may need to pursue high dose insulin. Replace magnesium and potassium if low.    Time to medical clearance: 12 hours     Poison Control Staff: Beatris Miranda, PharmD, [facility]  Emergency Medicine Clinical Pharmacist  [phone]          "

## 2023-05-11 NOTE — ED NOTES
Bed: ED02  Expected date:   Expected time:   Means of arrival:   Comments:  [facility]- 16 yo Ingestion

## 2023-05-11 NOTE — ED PROVIDER NOTES
".    History     Chief Complaint:  Ingestion       HPI   Gadiel James is a 17 year old male with a history of autism and depression who presents following ingestion. He reports that he was arguing with his father about money earlier this afternoon. Around 1556 he then ingested roughly two handfuls of his mothers medications. Three of four of these meds are known; propanolol, spironolactone, and levothyroxine.  Patientt also had access to tylenol extra strength, melatonin, methocarbamol, lurasidone, risperidone, unisom sleep tabs Immediately after he forced himself to vomit, he notes that only a few pills came out. This ingestion was reportedly a suicide attempts. He details present issues with depression including past suicide attempts. In regards to his suicide attempts he reports \"I have just given up. Trying just makes me hurt and makes me get mad.\" He regularly sees a therapist and psychiatrist for this. He denies nausea, dizziness, light headedness, and abdominal pain.    1727 The patient vomited following administration of activated charcoal    Independent Historian:   Father - They report portion of HPI and mental health history    Review of External Notes: None     ROS:  Review of Systems  See HPI    Allergies:  Ritalin [Methylphenidate Hcl]     Medications:    Buspar  Lexapro  Intuniv  Latuda  Glucophage  Risperdal  Semaglutide  Desyrel  Cholecalciferol  Insulin pen    Past Medical History:    ADHD  Autism spectrum disorder  Diabetes mellitus, type 2  Anxiety  Severe obesity  MDD  Madison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  Polycythemia    Past Surgical History:    ENT surgery  Myringotomy, insert tube bilateral x2  Myringotomy, insert tube adenoidectomy  Tonsillectomy, adenoidectomy     Family History:    Patient was adopted, family history unknown    Social History:  Patient is accompanied in the ED by his parents.  PCP: Rajesh Brower (Inactive)     Physical Exam     Patient Vitals for the past 24 " "hrs:   BP Temp Temp src Pulse Resp SpO2 Height Weight   05/11/23 2355 -- -- -- 83 -- 98 % -- --   05/11/23 1845 (!) 145/86 -- -- 104 -- 97 % -- --   05/11/23 1830 134/89 -- -- 90 -- 97 % -- --   05/11/23 1815 128/80 -- -- 95 -- 98 % -- --   05/11/23 1800 133/83 -- -- 99 -- 99 % -- --   05/11/23 1745 132/75 -- -- 101 -- 96 % -- --   05/11/23 1730 (!) 152/93 -- -- 111 -- 97 % -- --   05/11/23 1715 (!) 147/88 -- -- 94 -- 99 % -- --   05/11/23 1707 (!) 140/87 98.2  F (36.8  C) Oral 93 18 98 % 1.854 m (6' 1\") 122.5 kg (270 lb)        Physical Exam  General: Alert, no acute distress  Neuro:  PERRL.  EOMI.  No focal deficits  HEENT:  Moist mucous membranes.  Conjunctiva normal.  CV:  RRR, no m/r/g, skin warm and well perfused  Pulm:  CTAB, no wheezes/ronchi/rales.  No acute distress, breathing comfortably  GI:  Soft, nontender, nondistended.  No rebound or guarding.   MSK:  Moving all extremities.  No focal areas of edema, erythema  Skin:  WWP, no rashes, no lower extremity edema, skin color normal, no diaphoresis  Psych:  Well-appearing, normal affect, regular speech; suicidal ideation    Emergency Department Course   ECG  ECG taken at 1706, ECG read at 1709  Normal sinus rhythm  Nonspecific intraventricular conduction delay  Minimal voltage criteria for LVH, may be normal variant  Borderline ECG  Rate 87 bpm. OH interval 160 ms. QRS duration 118 ms. QT/QTc 372/447 ms. P-R-T axes 42 -22 7.     Laboratory:  Labs Ordered and Resulted from Time of ED Arrival to Time of ED Departure   BASIC METABOLIC PANEL - Abnormal       Result Value    Sodium 141      Potassium 4.1      Chloride 105      Carbon Dioxide (CO2) 24      Anion Gap 12      Urea Nitrogen 8.1      Creatinine 0.69      Calcium 9.7      Glucose 166 (*)     GFR Estimate >90     ACETAMINOPHEN LEVEL - Abnormal    Acetaminophen <5.0 (*)    ACETAMINOPHEN LEVEL - Abnormal    Acetaminophen 5.0 (*)    SALICYLATE LEVEL - Normal    Salicylate <0.3     TSH WITH FREE T4 REFLEX " - Normal    TSH 1.85     MAGNESIUM - Normal    Magnesium 1.9     CBC WITH PLATELETS AND DIFFERENTIAL    WBC Count 6.3      RBC Count 5.29      Hemoglobin 16.6      Hematocrit 47.3      MCV 89      MCH 31.4      MCHC 35.1      RDW 12.4      Platelet Count 288      % Neutrophils 48      % Lymphocytes 39      % Monocytes 7      % Eosinophils 5      % Basophils 1      % Immature Granulocytes 0      NRBCs per 100 WBC 0      Absolute Neutrophils 3.1      Absolute Lymphocytes 2.5      Absolute Monocytes 0.5      Absolute Eosinophils 0.3      Absolute Basophils 0.0      Absolute Immature Granulocytes 0.0      Absolute NRBCs 0.0        Emergency Department Course & Assessments:    Interventions:  Medications   ondansetron (ZOFRAN) injection 4 mg (4 mg Intravenous $Given 23)   charcoal activated in water (ACTIDOSE AQUA) liquid 50 g (50 g Oral $Given 23)        Assessments:   I obtained history and examined the patient as noted above.    Independent Interpretation (X-rays, CTs, rhythm strip):  None    Consultations/Discussion of Management or Tests:   I spoke with a DEC assesor regarding the patient.    134 I spoke with patient. He denies any further thoughts of self harm or suicide.      139 I spoke with patient's father regarding the patient's workup and plan with DEC and he is understanding and agreeable with the plan for discharge with outpatient follow up after medical clearance at 0356.    Social Determinants of Health affecting care:   None    Disposition:  The patient was discharged to home.     Impression & Plan      Medical Decision Makin-year-old male with history of autism spectrum disorder presenting to the ER for evaluation of intentional overdose of multiple medications.  He apparently made himself vomit at home with noted few tablets in his vomit.  Please see above for further details in the HPI.  He arrives well-appearing and vitally stable.  Pharmacy did speak with poison  control, please see their separate note.  Screening EKG shows no worrisome interval change.  Toxic/metabolic lab studies are negative including Tylenol and salicylate levels.  Patient remained stable during my shift without episodes of hypotension, seizures, bradycardia.  He is medically cleared.  DEC did evaluate the patient.  He is no longer suicidal and parents feel that the patient is baseline.  He is able to contract for safety and parents are comfortable taking the patient home.  I do feel discharge is appropriate as the act seem to be impulsive in nature patient does not have any suicidal ideation at baseline.  I do not feel that he is an acute danger to himself or others at this time.  Patient able to contract for safety.  He will follow-up with his current providers after his ER visit.  All questions were answered prior to discharge.    Diagnosis:    ICD-10-CM    1. Overdose, intentional self-harm, initial encounter (H)  T50.902A            Discharge Medications:  New Prescriptions    No medications on file        Scribe Disclosure:  I, KATELYN MUHAMMAD, am serving as a scribe at 5:20 PM on 5/11/2023 to document services personally performed by Mehrdad Lawson MD based on my observations and the provider's statements to me.      Mehrdad Lawson MD  05/12/23 0140

## 2023-05-11 NOTE — ED TRIAGE NOTES
"Pt arrives via EMS for ingestion. Per EMS, pt was fighting with father when he took four pill bottles, dumped them into his hand and swallowed them at 1556. Three of the four meds are known - propanolol, spironolactone, and levothyroxine. Pt also had access to tylenol extra strength, melatonin, methocarbamol, lurasidone, risperidone, unisom sleep tabs. Father Landon is guardian [phone]. Pt has history of autism, ADHD, depression, SI, anger management issues. Pt had three EKGs en route per EMS. First QT was 430 and recheck QT was 460. EMS placed 20g in left AC and reports blood sugar of 130. Pt admits that the ingestion was an attempt to kill himself. Reports he has a history of trying to hang self 1 year ago. Pt reports \"I have just given up. Trying just makes me hurt and get mad.\" EMS reports pt did vomit and a few tablets (6-7) were seen in the vomit.       "

## 2023-05-12 VITALS
SYSTOLIC BLOOD PRESSURE: 134 MMHG | TEMPERATURE: 98.4 F | WEIGHT: 270 LBS | HEIGHT: 73 IN | BODY MASS INDEX: 35.78 KG/M2 | HEART RATE: 85 BPM | OXYGEN SATURATION: 98 % | RESPIRATION RATE: 20 BRPM | DIASTOLIC BLOOD PRESSURE: 79 MMHG

## 2023-05-12 LAB
APAP SERPL-MCNC: 5 UG/ML (ref 10–30)
ATRIAL RATE - MUSE: 87 BPM
DIASTOLIC BLOOD PRESSURE - MUSE: NORMAL MMHG
INTERPRETATION ECG - MUSE: NORMAL
P AXIS - MUSE: 42 DEGREES
PR INTERVAL - MUSE: 160 MS
QRS DURATION - MUSE: 118 MS
QT - MUSE: 372 MS
QTC - MUSE: 447 MS
R AXIS - MUSE: -22 DEGREES
SYSTOLIC BLOOD PRESSURE - MUSE: NORMAL MMHG
T AXIS - MUSE: 7 DEGREES
VENTRICULAR RATE- MUSE: 87 BPM

## 2023-05-12 ASSESSMENT — ACTIVITIES OF DAILY LIVING (ADL)
ADLS_ACUITY_SCORE: 35
ADLS_ACUITY_SCORE: 35

## 2023-05-12 NOTE — ED PROVIDER NOTES
I received signout from Dr. Lawson regarding patient with reported ingestion.  Medically cleared at 4AM.  Patient evaluated by DEC and cleared.  Parents comfortable with plan for dispo.  Return precautions given.         Carlota Cordova, DO  05/12/23 0433

## 2023-05-12 NOTE — CONSULTS
Diagnostic Evaluation Consultation  Crisis Assessment    Patient was assessed: [facility]  Patient location: [facility]  Was a release of information signed: No. Reason: not necessary.       Referral Data and Chief Complaint  Gadiel is a 17 year old, who uses he/him pronouns, and presents to the ED via EMS. Patient is referred to the ED by family/friends. Patient is presenting to the ED for the following concerns: He ingested several bottles of his mother's prescription medication in an effort to commit suicide via overdose.      Informed Consent and Assessment Methods     Patient is reported to be under the guardianship of Meka Rossi and Landon James : verified by [facility] and documented in the ACP Tab . Writer met with patient and guardian and explained the crisis assessment process, including applicable information disclosures and limits to confidentiality, assessed understanding of the process, and obtained consent to proceed with the assessment. Patient was observed to be able to participate in the assessment as evidenced by verbal consent. Assessment methods included conducting a formal interview with patient, review of medical records, collaboration with medical staff, and obtaining relevant collateral information from family and community providers when available..     Over the course of this crisis assessment provided reassurance, offered validation, engaged patient in problem solving and disposition planning, worked with patient on safety and aftercare planning, provided psychoeducation and facilitated family communication. Patient's response to interventions was positive.      Summary of Patient Situation     Patient presents to the hospital with his parents for concerns of attempted suicide via overdose.  Patient reports he ingested several bottles of his mother's pills in order to commit suicide during an argument with his father.  He reports that this act was impulsive and reckless at  the time, he shares he does not have any suicidal ideation at baseline.  Patient has a history of 1 other attempt in a similar situation where he was arguing with his parents.  Patient shares he sometimes acts impulsively when he is unable to regulate his emotions.  Patient has a history of major depressive disorder, autism, and ADHD for diagnoses.  He reports these diagnoses have an effect on his ability to emotionally regulate in the moment.    Brief Psychosocial History     Patient currently resides at home with his parents who remain his guardians at this time due to his mental health concerns.  Patient shares he relies on his parents for majority of his daily activities.  Patient is currently searching for a job and has interviews in the near future.  Patient currently relies on his parents for financial support at this time.  Patient shares his hobbies are playing video games, talking with his friends, and chatting on discord with friends.  Patient identifies his parents as supports at this time.  He has a psychiatrist and therapist available in the outpatient setting as well.  There are no relevant legal issues at this time.  Cultural, Buddhism or spiritual influences were not discussed at this time regarding the patient's care.    Significant Clinical History     Patient has a history of major depressive disorder, autism, and ADHD diagnoses.  Patient has a history of at least 1 hospitalization due to a suicide attempt in a similar situation where he reports he was arguing with parents.  Patient shares he does not endorse suicidal ideation at baseline.  He reports he has no current concerns for suicidal ideation at this time, nor does he have means to access.  Patient does not have a history of commitments or other treatment programs at this time.  Patient did not disclose relevant trauma history.     Collateral Information  The following information was received from Meka Rossi whose relationship to  the patient is mother/guardian. Information was obtained in person. They last had contact with patient on this current day.    What happened today: He had a fight with his father and elected to attempt to overdose.     What is different about patient's functioning: He became angry and didn't regulate his emotions.     Concern about alcohol/drug use: No    What do you think the patient needs: Family therapy with his father. Other then that, he has the appropriate resources.     Has patient made comments about wanting to kill themselves/others:  No    If d/c is recommended, can they take part in safety/aftercare planning: Yes he can share what makes him safe and how he can utilize appropriate coping skills.     Other information: N/A     Risk Assessment  [place] Suicide Severity Rating Scale Full Clinical Version: 5/11/23  Suicidal Ideation  1. Wish to be Dead (Lifetime): Yes  1. Wish to be Dead (Past 1 Month): Yes  2. Non-Specific Active Suicidal Thoughts (Lifetime): No  Intensity of Ideation  Most Severe Ideation Rating (Lifetime): 2  Most Severe Ideation Rating (Past 1 Month): 2  Frequency (Lifetime): Less than once a week  Frequency (Past 1 Month): Less than once a week  Duration (Lifetime): Fleeting, few seconds or minutes  Duration (Past 1 Month): Less than 1 hour/some of the time  Controllability (Lifetime): Easily able to control thoughts  Controllability (Past 1 Month): Can control thoughts with some difficulty  Deterrents (Lifetime): Deterrents definitely stopped you from attempting suicide  Deterrents (Past 1 Month): Deterrents definitely stopped you from attempting suicide  Reasons for Ideation (Lifetime): Completely to get attention, revenge, or a reaction from others  Reasons for Ideation (Past 1 Month): Completely to get attention, revenge, or a reaction from others  Suicidal Behavior  Actual Attempt (Lifetime): No  Has subject engaged in non-suicidal self-injurious behavior? (Lifetime):  No  Interrupted Attempts (Lifetime): No  Aborted or Self-Interrupted Attempt (Lifetime): No  Preparatory Acts or Behavior (Lifetime): No  C-SSRS Risk (Lifetime/Recent)  Calculated C-SSRS Risk Score (Lifetime/Recent): Low Risk      Validity of evaluation is not impacted by presenting factors during interview evidenced by patient's ability to engage in conversation, report he is back to baseline status, and the confirmation from his mother that she believes he is safe to return home.   Comments regarding subjective versus objective responses to [place] tool: Patient's subjective reports appear to be in line with the objective data from the [place] tool.   Environmental or Psychosocial Events: threats to a prized relationship and challenging interpersonal relationships  Chronic Risk Factors: history of psychiatric hospitalization   Warning Signs: seeking access to means to hurt or kill self, talking or writing about death, dying, or suicide, rage, anger, seeking revenge, acting reckless or engaging in risky activities, dramatic changes in mood and engaging in self-destructive behavior  Protective Factors: strong bond to family unit, community support, or employment, lives in a responsibly safe and stable environment, good treatment engagement, sense of importance of health and wellness, able to access care without barriers, supportive ongoing medical and mental health care relationships, help seeking and optimistic outlook - identification of future goals  Interpretation of Risk Scoring, Risk Mitigation Interventions and Safety Plan:  Although patient's reason for hospitalization was due to a significant suicide attempt via overdose, it appears this was largely an impulsive act as a result of an argument he had with his father. Patient reports overall suicidal ideation is minimal to non-existent at this time. He reports at baseline he does not struggle with suicidal ideation. Patient's mother was present to confirm  and shares she believes patient is generally safe and not a risk for suicide. She informed this writer she feels safe taking the patient home at this time.     Does the patient have thoughts of harming others? No     Is the patient engaging in sexually inappropriate behavior?  no        Current Substance Abuse     Is there recent substance abuse? no     Was a urine drug screen or blood alcohol level obtained: No       Mental Status Exam     Affect: Appropriate   Appearance: Appropriate    Attention Span/Concentration: Attentive  Eye Contact: Engaged   Fund of Knowledge: Appropriate    Language /Speech Content: Fluent   Language /Speech Volume: Normal    Language /Speech Rate/Productions: Normal    Recent Memory: Intact   Remote Memory: Intact   Mood: Normal    Orientation to Person: Yes    Orientation to Place: Yes   Orientation to Time of Day: Yes    Orientation to Date: Yes    Situation (Do they understand why they are here?): Yes    Psychomotor Behavior: Normal    Thought Content: Clear   Thought Form: Goal Directed and Intact      History of commitment: No     Medication    Psychotropic medications:   No current facility-administered medications for this encounter.     Current Outpatient Medications   Medication     blood glucose (ACCU-CHEK GUIDE) test strip     blood glucose monitoring (ACCU-CHEK FASTCLIX) lancets     busPIRone (BUSPAR) 10 MG tablet     escitalopram (LEXAPRO) 20 MG tablet     guanFACINE HCl (INTUNIV) 3 MG TB24 24 hr tablet     insulin pen needle (32G X 6 MM) 32G X 6 MM miscellaneous     LATUDA 80 MG TABS tablet     metFORMIN (GLUCOPHAGE XR) 500 MG 24 hr tablet     Pediatric Multivitamins-Iron (CHILDRENS MULTI VITAMINS/IRON PO)     risperiDONE (RISPERDAL) 1 MG tablet     Semaglutide, 1 MG/DOSE, 4 MG/3ML SOPN     Semaglutide, 2 MG/DOSE, (OZEMPIC, 2 MG/DOSE,) 8 MG/3ML pen     traZODone (DESYREL) 50 MG tablet     vitamin D3 (CHOLECALCIFEROL) 50 mcg (2000 units) tablet       Medication changes made  in the last two weeks: No       Current Care Team    Primary Care Provider: Rajesh Brower MD  Psychiatrist: Dr. Isis MD  Therapist: [facility]  : No     CTSS or ARMHS: No  ACT Team: No  Other: No      Diagnosis    Major depressive disorder, Recurrent episode, Moderate - (F33.1)  Autism spectrum disorder - (F84.0)    Clinical Summary and Substantiation of Recommendations    Patient initially presented to the hospital with his parents after an overdose attempt, where he ingested several bottles of pills that belonged to his mother.  Patient reports that the act was impulsive in nature and he does not normally experience suicidal ideation at baseline.  Patient reports he does not currently have any suicidal ideation, plans, or intent at this time.  Patient was able to actively engage in safety planning with this writer and to rely upon his mother's support for confirmation that he is safe to go home.  Patient's mother confirms patient has no access to lethal means once for he returns home.  She believes the patient is safe to return home once he is medically cleared and stable.  She reports she is willing to take him home once able.  Per patient's interview, and collateral information from mother, patient appears to be appropriate to discharge home once he is medically cleared.  A safety plan was created with the patient, and he will follow up with his current providers after this hospital stay.  Patient's attending physician in the emergency department agrees with the stated plan of care and will plan to discharge patient home once he is medically cleared.  Disposition    Recommended disposition: Individual Therapy, Family Therapy and Medication Management       Reviewed case and recommendations with attending provider. Attending Name: Dr. Lulu MD     Attending concurs with disposition: Yes       Patient and/or validated legal guardian concurs with disposition: Yes       Final  disposition: Individual therapy , Family therapy  and Medication management.     Outpatient Details (if applicable):   Aftercare plan and appointments placed in the AVS and provided to patient: Yes. Given to patient by bedside nurse.     Was lethal means counseling provided as a part of aftercare planning? No;       Assessment Details    Patient interview started at: 7:14pm and completed at: 7:50pm.     Total duration spent on the patient case in minutes: 1.0 hrs      CPT code(s) utilized: 81475 - Psychotherapy for Crisis - 60 (30-74*) min       Gonzalo Purvis, MELITA, RASHEEDA, MELITA, Psychotherapist  [facility] - Triage & Transition Services  Callback: [phone]      Aftercare Plan  If I am feeling unsafe or I am in a crisis, I will:   Contact my established care providers   Call the National Suicide Prevention Lifeline: 988  Go to the nearest emergency room   Call 911     Warning signs that I or other people might notice when a crisis is developing for me: I begin escalating in anger and engage in self-destructive behaviors.     Things I am able to do on my own to cope or help me feel better: Play video games, hang out with family, watch TV.      Things that I am able to do with others to cope or help me better: Play video games with friends, hang out with family.      Things I can use or do for distraction: Music, TV, Video games     Changes I can make to support my mental health and wellness: Engage in family therapy with my dad.      People in my life that I can ask for help: Mom, dad, psychiatrist, therapist.      Your [facility] has a mental health crisis team you can call 24/7: [facility]  [phone]     Other things that are important when I'm in crisis: N/A     Additional resources and information:     Reduce Extreme Emotion  QUICKLY:  Changing Your Body Chemistry      T:  Change your body Temperature to change your autonomic nervous system   ? Use Ice Water to calm yourself down FAST   ? Put  your face in a bowl of ice water (this is the best way; have the person keep his/her face in ice water for 30-45 seconds - initial research is showing that the longer s/he can hold her/his face in the water, the better the response), or   ? Splash ice water on your face, or hold an ice pack on your face      I:  Intensely exercise to calm down a body revved up by emotion   ? Examples: running, walking fast, jumping, playing basketball, weight lifting, swimming, calisthenics, etc.   ? Engage in exercises that DO NOT include violent behaviors. Exercises that utilize violent behaviors tend to function as  behavioral rehearsal,  and rather than calming the person down, may actually  rev  the person up more, increasing the likelihood of violence, and lessening the likelihood that they will  burn off  energy     P:  Progressively relax your muscles   ? Starting with your hands, moving to your forearms, upper arms, shoulders, neck, forehead, eyes, cheeks and lips, tongue and teeth, chest, upper back, stomach, buttocks, thighs, calves, ankles, feet   ? Tense (10 seconds,   of the way), then relax each muscle (all the way)   ? Notice the tension   ? Notice the difference when relaxed (by tensing first, and then relaxing, you are able to get a more thorough relaxation than by simply relaxing)     P: Paced breathing to relax   ? The standard technique is to begin with counting the number of steps one takes for a typical inhale, then counting the steps one takes for a typical exhale, and then lengthening the amount of steps for the exhalation by one or two steps.  OR  ? Repeat this pattern for 1-2 minutes  ? Inhale for four (4) seconds   ? Exhale for six (6) to eight (8) seconds   ? Research demonstrated that one can change one's overall level of anxiety by doing this exercise for even a few minutes per day         Crisis Lines  Crisis Text Line  Text 748670  You will be connected with a trained live crisis counselor to  "provide support.    Por espanol, texto  BRIGETTE a 554620 o texto a 442-AYUDAME en WhatsApp    [employer] (LGBTQ Youth Crisis Line)  0.247.339.1112  text START to 803-402      Community Resources  Fast Tracker  Linking people to mental health and substance use disorder resources  [employer]     [place] Mental Health Warm Line  Peer to peer support  Monday thru Saturday, 12 pm to 10 pm  841.988.8199 or 5.273.133.9630  Text \"Support\" to 33562    National [place] on Mental Illness (TRES)  413.081.6192 or 1.888.TRES.HELPS      Mental Health Apps  My3  https://Whyd.org/    VirtualHopeBox  https://[employer]/apps/virtual-hope-box/      Additional Information  Today you were seen by a licensed mental health professional through Triage and Transition services, Behavioral Healthcare Providers ([facility])  for a crisis assessment in the Emergency Department at [facility].  It is recommended that you follow up with your established providers (psychiatrist, mental health therapist, and/or primary care doctor - as relevant) as soon as possible. Coordinators from [facility] will be calling you in the next 24-48 hours to ensure that you have the resources you need.  You can also contact [facility] coordinators directly at [phone]. You may have been scheduled for or offered an appointment with a mental health provider. [facility] maintains an extensive network of licensed behavioral health providers to connect patients with the services they need.  We do not charge providers a fee to participate in our referral network.  We match patients with providers based on a patient's specific needs, insurance coverage, and location.  Our first effort will be to refer you to a provider within your care system, and will utilize providers outside your care system as needed.                      "

## 2023-05-12 NOTE — DISCHARGE INSTRUCTIONS
Aftercare Plan  If I am feeling unsafe or I am in a crisis, I will:   Contact my established care providers   Call the National Suicide Prevention Lifeline: 988  Go to the nearest emergency room   Call 911     Warning signs that I or other people might notice when a crisis is developing for me: I begin escalating in anger and engage in self-destructive behaviors.     Things I am able to do on my own to cope or help me feel better: Play video games, hang out with family, watch TV.      Things that I am able to do with others to cope or help me better: Play video games with friends, hang out with family.      Things I can use or do for distraction: Music, TV, Video games     Changes I can make to support my mental health and wellness: Engage in family therapy with my dad.      People in my life that I can ask for help: Mom, dad, psychiatrist, therapist.      Your [facility] has a mental health crisis team you can call 24/7: [facility] Crisis  [phone]     Other things that are important when I'm in crisis: N/A     Additional resources and information:     Reduce Extreme Emotion  QUICKLY:  Changing Your Body Chemistry      T:  Change your body Temperature to change your autonomic nervous system   Use Ice Water to calm yourself down FAST   Put your face in a bowl of ice water (this is the best way; have the person keep his/her face in ice water for 30-45 seconds - initial research is showing that the longer s/he can hold her/his face in the water, the better the response), or   Splash ice water on your face, or hold an ice pack on your face      I:  Intensely exercise to calm down a body revved up by emotion   Examples: running, walking fast, jumping, playing basketball, weight lifting, swimming, calisthenics, etc.   Engage in exercises that DO NOT include violent behaviors. Exercises that utilize violent behaviors tend to function as  behavioral rehearsal,  and rather than calming the person down, may  "actually  rev  the person up more, increasing the likelihood of violence, and lessening the likelihood that they will  burn off  energy     P:  Progressively relax your muscles   Starting with your hands, moving to your forearms, upper arms, shoulders, neck, forehead, eyes, cheeks and lips, tongue and teeth, chest, upper back, stomach, buttocks, thighs, calves, ankles, feet   Tense (10 seconds,   of the way), then relax each muscle (all the way)   Notice the tension   Notice the difference when relaxed (by tensing first, and then relaxing, you are able to get a more thorough relaxation than by simply relaxing)     P: Paced breathing to relax   The standard technique is to begin with counting the number of steps one takes for a typical inhale, then counting the steps one takes for a typical exhale, and then lengthening the amount of steps for the exhalation by one or two steps.  OR  Repeat this pattern for 1-2 minutes  Inhale for four (4) seconds   Exhale for six (6) to eight (8) seconds   Research demonstrated that one can change one's overall level of anxiety by doing this exercise for even a few minutes per day         Crisis Lines  Crisis Text Line  Text 805795  You will be connected with a trained live crisis counselor to provide support.    Por espanol, texto  BRIGETTE a 557287 o texto a 442-AYUDAME en WhatsApp    [employer] (LGBTQ Youth Crisis Line)  3.855.231.8910  text START to 940-937      Community [employer]  Fast Tracker  Linking people to mental health and substance use disorder resources  Fast Societytrackermn.org     [place] Mental Health Warm Line  Peer to peer support  Monday thru Saturday, 12 pm to 10 pm  675.039.8005 or 4.305.307.3406  Text \"Support\" to 43464    National [place] on Mental Illness (TRES)  433.755.5667 or 1.888.TRES.HELPS      Mental Health Apps  My3  https://myHyperactive Mediapp.org/    VirtualHopeBox  https://[employer]..org/apps/virtual-hope-box/      Additional Information  Today " you were seen by a licensed mental health professional through Triage and Transition services, Behavioral Healthcare Providers ([facility])  for a crisis assessment in the Emergency Department at [facility].  It is recommended that you follow up with your established providers (psychiatrist, mental health therapist, and/or primary care doctor - as relevant) as soon as possible. Coordinators from [facility] will be calling you in the next 24-48 hours to ensure that you have the resources you need.  You can also contact [facility] coordinators directly at [phone]. You may have been scheduled for or offered an appointment with a mental health provider. [facility] maintains an extensive network of licensed behavioral health providers to connect patients with the services they need.  We do not charge providers a fee to participate in our referral network.  We match patients with providers based on a patient's specific needs, insurance coverage, and location.  Our first effort will be to refer you to a provider within your care system, and will utilize providers outside your care system as needed.

## 2023-05-31 NOTE — PROGRESS NOTES
"Pediatric Endocrinology Follow-up Consultation: Diabetes    Patient: Gadiel James MRN# [id number]   YOB: 2005 Age: 17 year old   Date of Visit: 6/2/2023    Dear Dr. Carrasco primary care provider on file.    I had the pleasure of seeing your patient, Gadiel James in the Pediatric Endocrinology Clinic, [facility], on 6/2/2023 for a follow-up consultation of T2DM.  Gadiel was last seen in our clinic on March 28, 2023.        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     Priority: Medium     Major depressive disorder, single episode, moderate (H) 06/17/2016     Priority: Medium     Attention deficit hyperactivity disorder (ADHD), combined type 06/16/2016     Priority: Medium     Back pain 03/09/2015     Priority: Medium     Pain in joint involving ankle and foot 03/09/2015     Priority: Medium     Severe obesity due to excess calories without serious comorbidity with body mass index (BMI) greater than 99th percentile for age in pediatric patient (H) 09/24/2014     Priority: Medium     Developmental reading disorder 06/09/2014     Priority: Medium     Anxiety 11/11/2011     Priority: Medium     Active autistic disorder 08/17/2010     Priority: Medium            HPI:   History was obtained from patient, patient's father and electronic health record.     Gadiel, \"Kavin,\" is a 17 year old male diagnosed with type 2 diabetes on 12/28/2018.  Gadiel also has an extensive health history which includes: autism, ADHD, polycythemia, hypertriglyceridemia, low HDL, elevated transaminases, elevated LFT's and depression.  Gadiel is accompanied by his father today and returns for a follow-up after " "having last been seen by me on March 28, 2023.  At the conclusion of the last visit, the plan was to continue with current medications. Since the last visit, this NP spoke with mom regarding trends of elevated fasting glucose levels. The plan was to increase the metformin ER to 2000mg once daily - this was just started last week. Kavin reports that diabetes management has been going \"fine.\" He notes that he's been eating less fried foods lately and goes on walks with his dad. Of note, his weight has decreased 8 pounds in the last 3 weeks. He continues to check fasting glucose levels 2-3 times week with levels primarily in the mid 150's. He denies any additional concerns at this time.    We discussed Kavin's most recent ED visit for SI along with his PHQ2-PHQ9 results today. Kavin is feeling tired today, but stated that he is safe to go home. Dad notes that Dr. Miller has been following for mental health, however, family is in the process of finding a different mental health provider because Kavin does not connect with his current provider. Dad denies any additional concerns at this time.      We reviewed the following additional history at today's visit:  Polycythemia - Kavin was seen by the hematology team in November 2022 and the juardiance was subsequently stopped due to a 3-4% risk of elevated hematocrit levels.      ED visit - 5/11/23 for ingestion of pills - suicide attempt. He sees Dr. Miller for mental health and medication management.      Today's concerns include: None    Blood Glucose Trends Recognized (Independent interpretation of glucose data): Fasting glucose levels are above goal of <120. BG checks 1-2 times/week - fasting only.    Diet: Gadiel has no dietary restrictions.  Exercise: ad erika    I reviewed new history from the patient and the medical record.  I have reviewed previous lab results and records, patient BMI and the growth chart at today's visit.  I have reviewed the pump and sensor downloads " today.    Blood Glucose Data:    71% of time glucose is in target  29% of time glucose is above target  0%of time glucose is below target    A1c:     Today s hemoglobin A1c:   Hemoglobin A1C   Date Value Ref Range Status   07/11/2022 7.2 (H) 0.0 - 5.6 % Final     Comment:     Normal <5.7%   Prediabetes 5.7-6.4%    Diabetes 6.5% or higher     Note: Adopted from ADA consensus guidelines.   06/28/2022 7.3 (A) 0.0 - 5.7 % Final      Result was discussed at today's visit.     Hemoglobin A1C   Date Value Ref Range Status   07/11/2022 7.2 (H) 0.0 - 5.6 % Final     Comment:     Normal <5.7%   Prediabetes 5.7-6.4%    Diabetes 6.5% or higher     Note: Adopted from ADA consensus guidelines.   06/28/2022 7.3 (A) 0.0 - 5.7 % Final   12/14/2021 7.0 (A) 0.0 - 5.7 % Final   09/07/2021 6.0 (H) 0.0 - 5.6 % Final     Comment:     Normal <5.7%   Prediabetes 5.7-6.4%    Diabetes 6.5% or higher     Note: Adopted from ADA consensus guidelines.   01/18/2021 8.6 (H) 0 - 5.6 % Final     Comment:     Results confirmed by repeat test  Normal <5.7% Prediabetes 5.7-6.4%  Diabetes 6.5% or higher - adopted from ADA   consensus guidelines.       Hemoglobin A1C POCT   Date Value Ref Range Status   06/02/2023 7.2 4.3 - <5.7 % Final   09/27/2022 7.6 4.3 - <5.7 % Final       Current diabetes regimen:   Ozempic 2 mg once weekly  Metformin ER 4 pills at dinner (2000 mg once daily)          Social History:     Social History     Social History Narrative    7/21/2020: Kavin lives with his parents. He's an only child. He's going into 11th grade in the fall. He likes [employer], gardening, cooking, and video games. He collect video consols.         11/17/2020: Kavin lives with his parents. He's in 11th grade and is doing online schooling now due to the COVID-19 pandemic. He is not happy with it. He's getting a 3 D printer and wants a camping oven top for [employer].         1/19/2021: Kavin lives with his parents in [place], [place]. He's in 11th grade (online  schooling due to the COVID-19 pandemic, although he will be going back to clinic next week). He got his 3 D printer ([employer]).        3/23/21: Kavin is adopted, so family history is unknown. He is in the 11th grade for the [id number] school year. He has been enjoying his 3D printer.         12/14/21: Kavin is living at home with adoptive parents.        2/8/22: Kavin is homebound for school at this time.        6/2/23: Kavin is working at [employer] this summer. He lives at home with both mom and dad.            Family History:     Family History   Adopted: Yes   Problem Relation Age of Onset     Family History Negative Mother      Family History Negative Father        Family history was reviewed and is unchanged. Refer to the initial note.         Allergies:     Allergies   Allergen Reactions     Ritalin [Methylphenidate Hcl] Other (See Comments)     hallucinations             Medications:     Current Outpatient Medications   Medication Sig Dispense Refill     blood glucose (ACCU-CHEK GUIDE) test strip Use to test blood sugar 5 times daily or as directed. 150 strip 6     blood glucose monitoring (ACCU-CHEK FASTCLIX) lancets Use to test blood sugar 3 times daily or as directed. 102 each 3     busPIRone (BUSPAR) 10 MG tablet Take 10 mg by mouth daily       escitalopram (LEXAPRO) 20 MG tablet Take 20 mg by mouth daily        guanFACINE HCl (INTUNIV) 3 MG TB24 24 hr tablet Take 3 mg by mouth At Bedtime        insulin pen needle (32G X 6 MM) 32G X 6 MM miscellaneous Use 4 pen needles daily or as directed. 200 each 11     LATUDA 80 MG TABS tablet        metFORMIN (GLUCOPHAGE XR) 500 MG 24 hr tablet Take 4 tablets (2,000 mg) by mouth daily (with dinner) 360 tablet 3     Pediatric Multivitamins-Iron (CHILDRENS MULTI VITAMINS/IRON PO) Take 1 tablet by mouth daily        risperiDONE (RISPERDAL) 1 MG tablet Take 1 mg by mouth 2 times daily        Semaglutide, 1 MG/DOSE, 4 MG/3ML SOPN Inject 1 mg Subcutaneous every 7 days (Patient not  "taking: Reported on 3/28/2023) 3 mL 6     Semaglutide, 2 MG/DOSE, (OZEMPIC, 2 MG/DOSE,) 8 MG/3ML pen Inject 2 mg Subcutaneous every 7 days 3 mL 6     traZODone (DESYREL) 50 MG tablet Take  mg by mouth At Bedtime  (Patient not taking: Reported on 11/3/2022)       vitamin D3 (CHOLECALCIFEROL) 50 mcg (2000 units) tablet Take 1 tablet by mouth daily               Review of Systems:     A comprehensive review of systems was performed and was negative, unless otherwise stated in HPI above.         Physical Exam:   Blood pressure 124/80, pulse 99, height 1.862 m (6' 1.31\"), weight 119.2 kg (262 lb 12.6 oz).  Blood pressure %peterson are not available for patients who are 18 years or older.  Height: 6' 1.307\", 91 %ile (Z= 1.32) based on CDC (Boys, 2-20 Years) Stature-for-age data based on Stature recorded on 6/2/2023.  Weight: 262 lbs 12.61 oz, >99 %ile (Z= 2.59) based on CDC (Boys, 2-20 Years) weight-for-age data using vitals from 6/2/2023.  BMI: Body mass index is 34.38 kg/m ., 98 %ile (Z= 2.13) based on CDC (Boys, 2-20 Years) BMI-for-age based on BMI available as of 6/2/2023.      CONSTITUTIONAL:   Awake, alert, and in no apparent distress.  HEAD: Normocephalic, without obvious abnormality.  EYES: Lids and lashes normal, sclera clear, conjunctiva normal.  ENT: External ears without lesions, nares clear, oral pharynx with moist mucus membranes.  NECK: Supple, symmetrical, trachea midline.  THYROID: symmetric, not enlarged and no tenderness.  HEMATOLOGIC/LYMPHATIC: No cervical lymphadenopathy.  LUNGS: No increased work of breathing, clear to auscultation with good air entry  CARDIOVASCULAR: Regular rate and rhythm, no murmurs.  ABDOMEN: Soft, non-distended, non-tender, no masses palpated, no hepatosplenomegaly.  NEUROLOGIC: No focal deficits noted.   PSYCHIATRIC: Cooperative, no agitation.  SKIN: Insulin administration sites intact without lipohypertrophy. No acanthosis nigricans.  MUSCULOSKELETAL:  Full range of motion " noted.  Motor strength and tone are normal.         Diabetes Health Maintenance:   Date of Diabetes Diagnosis:  12/28/2018    Antibodies done (yes/no):    If Yes, Antibody Results: No results found for: INAB, IA2ABY, IA2A, GLTA, ISCAB, [id number], [id number], INSABRIA  Special Notes (if any):     Dates of Episodes DKA (month/year, cumulative excluding diagnosis, ongoing, assess each visit): None  Dates of Episodes Severe* Hypoglycemia (month/year, cumulative, ongoing, assess each visit): None   *Severe=patient unconscious, seizure, unable to help self    Date Last Saw Dietitian:     Date Last Eye Exam: 8/2020  Patient Report or Letter?  Report  Location of Eye Exam:   Date Last Flu Shot (or refused): 10/2020    Date Last Annual Lab Studies:   IgA Deficient (yes/no, date screened): No results found for: IGA  Celiac Screen (annual): No results found for: TTG  Thyroid (every 2 years):   TSH   Date Value Ref Range Status   05/11/2023 1.85 0.50 - 4.30 uIU/mL Final   07/11/2022 2.05 0.40 - 4.00 mU/L Final   01/18/2021 2.61 0.40 - 4.00 mU/L Final     T4 Free   Date Value Ref Range Status   01/18/2021 1.04 0.76 - 1.46 ng/dL Final     Free T4   Date Value Ref Range Status   07/11/2022 1.02 0.76 - 1.46 ng/dL Final     Lipids (every 5 years age 10 and older):   Cholesterol   Date Value Ref Range Status   07/11/2022 172 (H) <170 mg/dL Final   01/18/2021 167 <170 mg/dL Final     Triglycerides   Date Value Ref Range Status   07/11/2022 390 (H) <90 mg/dL Final   01/18/2021 427 (H) <90 mg/dL Final     Comment:     Borderline high:   mg/dl  High:            >129 mg/dl  Fasting specimen       HDL Cholesterol   Date Value Ref Range Status   01/18/2021 38 (L) >45 mg/dL Final     Comment:     Low:             <40 mg/dl  Borderline low:   40-45 mg/dl       Direct Measure HDL   Date Value Ref Range Status   07/11/2022 34 (L) >=40 mg/dL Final     LDL Cholesterol Calculated   Date Value Ref Range Status   07/11/2022 60 <=110 mg/dL Final    01/18/2021  <110 mg/dL Final    Cannot estimate LDL when triglyceride exceeds 400 mg/dL     Cholesterol/HDL Ratio   Date Value Ref Range Status   03/07/2015 2.4 0.0 - 5.0 Final     Non HDL Cholesterol   Date Value Ref Range Status   07/11/2022 138 (H) <120 mg/dL Final   01/18/2021 129 (H) <120 mg/dL Final     Comment:     Borderline high:  120-144 mg/dl  High:            >144 mg/dl       Urine Microalbumin (annual): No results found for: MICROALB, CREATCONC, MICROALBUMIN    Missed days of school related to diabetes concerns (illness, hypoglycemia, parental worry since last visit due to DM, excluding routine medical visits): None    Mental Health:    Today's PHQ-2 Mental Health Survey Score (every visit age 10 and older depression screening):  PHQ-2 Score:         3/28/2023     1:23 PM 8/20/2019     3:36 PM   PHQ-2 ( 1999 [employer])   Q1: Little interest or pleasure in doing things 0 1   Q2: Feeling down, depressed or hopeless 1 1   PHQ-2 Score 1 2   PHQ-2 Total Score (12-17 Years)- Positive if 3 or more points; Administer PHQ-A if positive  2        PHQ-9 score:        6/2/2023     9:58 AM   PHQ   PHQ-9 Total Score 4   Q9: Thoughts of better off dead/self-harm past 2 weeks Several days   He was seen in the ED on 5/11/23 for attempt of self-harm with ingestion of multiple medications.          Laboratory results:     Albumin Urine mg/L   Date Value Ref Range Status   09/07/2021 6 mg/L Final          Admission on 05/11/2023, Discharged on 05/12/2023   Component Date Value Ref Range Status     Ventricular Rate 05/11/2023 87  BPM Incomplete     Atrial Rate 05/11/2023 87  BPM Incomplete     NE Interval 05/11/2023 160  ms Incomplete     QRS Duration 05/11/2023 118  ms Incomplete     QT 05/11/2023 372  ms Incomplete     QTc 05/11/2023 447  ms Incomplete     P Axis 05/11/2023 42  degrees Incomplete     R AXIS 05/11/2023 -22  degrees Incomplete     T Axis 05/11/2023 7  degrees Incomplete     Interpretation ECG 05/11/2023     Incomplete                    Value:Sinus rhythm  Non-specific intra-ventricular conduction delay  Minimal voltage criteria for LVH, may be normal variant ( R in aVL )  Borderline ECG  No previous ECGs available       Hold Specimen 05/11/2023 JI   Final     Hold Specimen 05/11/2023 JI   Final     Hold Specimen 05/11/2023 J[facility]   Final     Hold Specimen 05/11/2023 [facility]   Final     Sodium 05/11/2023 141  136 - 145 mmol/L Final     Potassium 05/11/2023 4.1  3.4 - 5.3 mmol/L Final     Chloride 05/11/2023 105  98 - 107 mmol/L Final     Carbon Dioxide (CO2) 05/11/2023 24  22 - 29 mmol/L Final     Anion Gap 05/11/2023 12  7 - 15 mmol/L Final     Urea Nitrogen 05/11/2023 8.1  6.0 - 20.0 mg/dL Final     Creatinine 05/11/2023 0.69  0.67 - 1.17 mg/dL Final     Calcium 05/11/2023 9.7  8.6 - 10.0 mg/dL Final     Glucose 05/11/2023 166 (H)  70 - 99 mg/dL Final     GFR Estimate 05/11/2023 >90  >60 mL/min/1.73m2 Final    eGFR calculated using 2021 CKD-EPI equation.     Acetaminophen 05/11/2023 <5.0 (L)  10.0 - 30.0 ug/mL Final     Salicylate 05/11/2023 <0.3    mg/dL Final    Salicylate Reference Range   Therapeutic:       3-10 mg/dL  Anti inflammatory: 15-30 mg/dL     WBC Count 05/11/2023 6.3  4.0 - 11.0 10e3/uL Final     RBC Count 05/11/2023 5.29  4.40 - 5.90 10e6/uL Final     Hemoglobin 05/11/2023 16.6  13.3 - 17.7 g/dL Final     Hematocrit 05/11/2023 47.3  40.0 - 53.0 % Final     MCV 05/11/2023 89  78 - 100 fL Final     MCH 05/11/2023 31.4  26.5 - 33.0 pg Final     MCHC 05/11/2023 35.1  31.5 - 36.5 g/dL Final     RDW 05/11/2023 12.4  10.0 - 15.0 % Final     Platelet Count 05/11/2023 288  150 - 450 10e3/uL Final     % Neutrophils 05/11/2023 48  % Final     % Lymphocytes 05/11/2023 39  % Final     % Monocytes 05/11/2023 7  % Final     % Eosinophils 05/11/2023 5  % Final     % Basophils 05/11/2023 1  % Final     % Immature Granulocytes 05/11/2023 0  % Final     NRBCs per 100 WBC 05/11/2023 0  <1 /100 Final     Absolute Neutrophils  05/11/2023 3.1  1.6 - 8.3 10e3/uL Final     Absolute Lymphocytes 05/11/2023 2.5  0.8 - 5.3 10e3/uL Final     Absolute Monocytes 05/11/2023 0.5  0.0 - 1.3 10e3/uL Final     Absolute Eosinophils 05/11/2023 0.3  0.0 - 0.7 10e3/uL Final     Absolute Basophils 05/11/2023 0.0  0.0 - 0.2 10e3/uL Final     Absolute Immature Granulocytes 05/11/2023 0.0  <=0.4 10e3/uL Final     Absolute NRBCs 05/11/2023 0.0  10e3/uL Final     TSH 05/11/2023 1.85  0.50 - 4.30 uIU/mL Final     Magnesium 05/11/2023 1.9  1.7 - 2.3 mg/dL Final     Acetaminophen 05/11/2023 5.0 (L)  10.0 - 30.0 ug/mL Final   Lab on 11/20/2022   Component Date Value Ref Range Status     WBC Count 11/20/2022 7.4  4.0 - 11.0 10e3/uL Final     RBC Count 11/20/2022 5.58  4.40 - 5.90 10e6/uL Final     Hemoglobin 11/20/2022 17.7  13.3 - 17.7 g/dL Final     Hematocrit 11/20/2022 50.6  40.0 - 53.0 % Final     MCV 11/20/2022 91  78 - 100 fL Final     MCH 11/20/2022 31.7  26.5 - 33.0 pg Final     MCHC 11/20/2022 35.0  31.5 - 36.5 g/dL Final     RDW 11/20/2022 12.3  10.0 - 15.0 % Final     Platelet Count 11/20/2022 266  150 - 450 10e3/uL Final     % Neutrophils 11/20/2022 39  % Final     % Lymphocytes 11/20/2022 45  % Final     % Monocytes 11/20/2022 7  % Final     % Eosinophils 11/20/2022 8  % Final     % Basophils 11/20/2022 1  % Final     % Immature Granulocytes 11/20/2022 0  % Final     NRBCs per 100 WBC 11/20/2022 0  <1 /100 Final     Absolute Neutrophils 11/20/2022 2.9  1.6 - 8.3 10e3/uL Final     Absolute Lymphocytes 11/20/2022 3.3  0.8 - 5.3 10e3/uL Final     Absolute Monocytes 11/20/2022 0.5  0.0 - 1.3 10e3/uL Final     Absolute Eosinophils 11/20/2022 0.6  0.0 - 0.7 10e3/uL Final     Absolute Basophils 11/20/2022 0.0  0.0 - 0.2 10e3/uL Final     Absolute Immature Granulocytes 11/20/2022 0.0  <=0.4 10e3/uL Final     Absolute NRBCs 11/20/2022 0.0  10e3/uL Final   Oncology Visit on 11/03/2022   Component Date Value Ref Range Status     Sodium 11/03/2022 137  136 - 145  mmol/L Final     Potassium 11/03/2022 4.2  3.4 - 5.3 mmol/L Final     Chloride 11/03/2022 102  98 - 107 mmol/L Final     Carbon Dioxide (CO2) 11/03/2022 22  22 - 29 mmol/L Final     Anion Gap 11/03/2022 13  7 - 15 mmol/L Final     Urea Nitrogen 11/03/2022 10.1  6.0 - 20.0 mg/dL Final     Creatinine 11/03/2022 0.61 (L)  0.67 - 1.17 mg/dL Final     Calcium 11/03/2022 9.6  8.6 - 10.0 mg/dL Final     Glucose 11/03/2022 162 (H)  70 - 99 mg/dL Final     Alkaline Phosphatase 11/03/2022 95  40 - 129 U/L Final     AST 11/03/2022 65 (H)  10 - 50 U/L Final     ALT 11/03/2022 106 (H)  10 - 50 U/L Final     Protein Total 11/03/2022 7.3  6.4 - 8.3 g/dL Final     Albumin 11/03/2022 4.5  3.5 - 5.2 g/dL Final     Bilirubin Total 11/03/2022 0.6  <=1.2 mg/dL Final     GFR Estimate 11/03/2022 >90  >60 mL/min/1.73m2 Final    Effective December 21, 2021 eGFRcr in adults is calculated using the 2021 CKD-EPI creatinine equation which includes age and gender (Maik rankin al., NEJ, DOI: 10.1056/IKJIvz1345593)     Ferritin 11/03/2022 317  31 - 409 ng/mL Final     Iron 11/03/2022 171 (H)  61 - 157 ug/dL Final     Iron Sat Index 11/03/2022 46  15 - 46 % Final     Iron Binding Capacity 11/03/2022 372  240 - 430 ug/dL Final     Lactate Dehydrogenase 11/03/2022 154  0 - 250 U/L Final     Interpretation 11/03/2022    Final                    Value:This result contains rich text formatting which cannot be displayed here.     Significant Results 11/03/2022    Final                    Value:This result contains rich text formatting which cannot be displayed here.     Test Details 11/03/2022    Final                    Value:This result contains rich text formatting which cannot be displayed here.     Specimen Description 11/03/2022    Final                    Value:This result contains rich text formatting which cannot be displayed here.     Final Diagnosis 11/03/2022    Final                    Value:This result contains rich text formatting which  cannot be displayed here.     Comment 11/03/2022    Final                    Value:This result contains rich text formatting which cannot be displayed here.     Clinical Information 11/03/2022    Final                    Value:This result contains rich text formatting which cannot be displayed here.     Peripheral Smear 11/03/2022    Final                    Value:This result contains rich text formatting which cannot be displayed here.     Peripheral Hematologic Data 11/03/2022    Final                    Value:This result contains rich text formatting which cannot be displayed here.     Performing Labs 11/03/2022    Final                    Value:This result contains rich text formatting which cannot be displayed here.     WBC Count 11/03/2022 7.5  4.0 - 11.0 10e3/uL Final     RBC Count 11/03/2022 5.82  4.40 - 5.90 10e6/uL Final     Hemoglobin 11/03/2022 18.0 (H)  13.3 - 17.7 g/dL Final     Hematocrit 11/03/2022 52.6  40.0 - 53.0 % Final     MCV 11/03/2022 90  78 - 100 fL Final     MCH 11/03/2022 30.9  26.5 - 33.0 pg Final     MCHC 11/03/2022 34.2  31.5 - 36.5 g/dL Final     RDW 11/03/2022 12.6  10.0 - 15.0 % Final     Platelet Count 11/03/2022 262  150 - 450 10e3/uL Final     % Neutrophils 11/03/2022 44  % Final     % Lymphocytes 11/03/2022 42  % Final     % Monocytes 11/03/2022 7  % Final     % Eosinophils 11/03/2022 6  % Final     % Basophils 11/03/2022 1  % Final     % Immature Granulocytes 11/03/2022 0  % Final     NRBCs per 100 WBC 11/03/2022 0  <1 /100 Final     Absolute Neutrophils 11/03/2022 3.3  1.6 - 8.3 10e3/uL Final     Absolute Lymphocytes 11/03/2022 3.1  0.8 - 5.3 10e3/uL Final     Absolute Monocytes 11/03/2022 0.5  0.0 - 1.3 10e3/uL Final     Absolute Eosinophils 11/03/2022 0.4  0.0 - 0.7 10e3/uL Final     Absolute Basophils 11/03/2022 0.1  0.0 - 0.2 10e3/uL Final     Absolute Immature Granulocytes 11/03/2022 0.0  <=0.4 10e3/uL Final     Absolute NRBCs 11/03/2022 0.0  10e3/uL Final     %  Reticulocyte 11/03/2022 1.6  0.5 - 2.0 % Final     Absolute Reticulocyte 11/03/2022 0.094  0.025 - 0.095 10e6/uL Final     Erythropoietin 11/03/2022 12  4 - 27 mU/mL Final    Comment: INTERPRETIVE INFORMATION: Erythropoietin  Normal serum concentrations of erythropoietin for 95% of   individuals with normal hematocrits range from 4-27 mU/mL.    As the hematocrit is lowered by iron deficiency, aplastic,   or hemolytic anemia, the concentration of erythropoietin   increases as shown in the graph below. In the absence of   anemia, elevated concentrations are seen in renal tumors,   as a manifestation of renal transplant rejection, and in   secondary polycythemia. Low values may be observed in   hemochromatosis.          Expected Erythropoietin Concentrations in Patients                     with Uncomplicated Anemia       Erythropoietin (mU/mL)                100,000 - +                          +                 10,000 - +.......                          + .......                  1,000 - +    .......                          +     ........                    100 - +       ........                          +        ........                     10 - +                                     ........                          +---+---+---+---+---+---+                         10   20  30  40  50  60  70                                (Hematocrit %)              (Contributions To Nephrology 1988:66:54-62)    Decreased erythropoietin concentrations with an elevated   hematocrit are observed in patients with polycythemia rubra   vera, and with a decreased hematocrit in patients with HIV   infection who are receiving AZT.  Patients on AZT who have   anemia and erythropoietin concentrations of less than or   equal to 500 mU/mL may benefit from therapy with   recombinant EPO (:[id number],1990).  Performed By: [facility]  [address] [place] [id number]  : Myke Foster MD, PhD    Office Visit on 09/27/2022   Component Date Value Ref Range Status     Hemoglobin A1C POCT 09/27/2022 7.6  4.3 - <5.7 % Final   Lab on 09/13/2022   Component Date Value Ref Range Status     CK 09/13/2022 49  39 - 308 U/L Final     Vitamin B12 09/13/2022 683  232 - 1,245 pg/mL Final     Sodium 09/13/2022 138  136 - 145 mmol/L Final     Potassium 09/13/2022 3.8  3.4 - 5.3 mmol/L Final     Chloride 09/13/2022 102  98 - 107 mmol/L Final     Carbon Dioxide (CO2) 09/13/2022 20 (L)  22 - 29 mmol/L Final     Anion Gap 09/13/2022 16 (H)  7 - 15 mmol/L Final     Urea Nitrogen 09/13/2022 7.7  6.0 - 20.0 mg/dL Final     Creatinine 09/13/2022 0.68  0.67 - 1.17 mg/dL Final     Calcium 09/13/2022 9.5  8.6 - 10.0 mg/dL Final     Glucose 09/13/2022 226 (H)  70 - 99 mg/dL Final     Alkaline Phosphatase 09/13/2022 100  40 - 129 U/L Final     AST 09/13/2022 49  10 - 50 U/L Final     ALT 09/13/2022 95 (H)  10 - 50 U/L Final     Protein Total 09/13/2022 7.9  6.4 - 8.3 g/dL Final     Albumin 09/13/2022 4.7  3.5 - 5.2 g/dL Final     Bilirubin Total 09/13/2022 0.6  <=1.2 mg/dL Final     GFR Estimate 09/13/2022 >90  >60 mL/min/1.73m2 Final    Effective December 21, 2021 eGFRcr in adults is calculated using the 2021 CKD-EPI creatinine equation which includes age and gender (Maik et al., NEJ, DOI: 10.1056/RHZPjx5479572)     WBC Count 09/13/2022 7.0  4.0 - 11.0 10e3/uL Final     RBC Count 09/13/2022 6.22 (H)  4.40 - 5.90 10e6/uL Final     Hemoglobin 09/13/2022 19.4 (H)  13.3 - 17.7 g/dL Final     Hematocrit 09/13/2022 57.5 (H)  40.0 - 53.0 % Final     MCV 09/13/2022 92  78 - 100 fL Final     MCH 09/13/2022 31.2  26.5 - 33.0 pg Final     MCHC 09/13/2022 33.7  31.5 - 36.5 g/dL Final     RDW 09/13/2022 12.4  10.0 - 15.0 % Final     Platelet Count 09/13/2022 288  150 - 450 10e3/uL Final     % Neutrophils 09/13/2022 47  % Final     % Lymphocytes 09/13/2022 38  % Final     % Monocytes 09/13/2022 7  % Final     % Eosinophils 09/13/2022 7  % Final      % Basophils 09/13/2022 1  % Final     % Immature Granulocytes 09/13/2022 0  % Final     NRBCs per 100 WBC 09/13/2022 0  <1 /100 Final     Absolute Neutrophils 09/13/2022 3.3  1.6 - 8.3 10e3/uL Final     Absolute Lymphocytes 09/13/2022 2.6  0.8 - 5.3 10e3/uL Final     Absolute Monocytes 09/13/2022 0.5  0.0 - 1.3 10e3/uL Final     Absolute Eosinophils 09/13/2022 0.5  0.0 - 0.7 10e3/uL Final     Absolute Basophils 09/13/2022 0.1  0.0 - 0.2 10e3/uL Final     Absolute Immature Granulocytes 09/13/2022 0.0  <=0.4 10e3/uL Final     Absolute NRBCs 09/13/2022 0.0  10e3/uL Final   Lab on 07/11/2022   Component Date Value Ref Range Status     Bilirubin Total 07/11/2022 0.5  0.2 - 1.3 mg/dL Final     Bilirubin Direct 07/11/2022 0.1  0.0 - 0.2 mg/dL Final     Protein Total 07/11/2022 8.2  6.8 - 8.8 g/dL Final     Albumin 07/11/2022 4.3  3.4 - 5.0 g/dL Final     Alkaline Phosphatase 07/11/2022 95  65 - 260 U/L Final     AST 07/11/2022 48 (H)  0 - 35 U/L Final     ALT 07/11/2022 115 (H)  0 - 50 U/L Final     TSH 07/11/2022 2.05  0.40 - 4.00 mU/L Final     Free T4 07/11/2022 1.02  0.76 - 1.46 ng/dL Final     Cholesterol 07/11/2022 172 (H)  <170 mg/dL Final     Triglycerides 07/11/2022 390 (H)  <90 mg/dL Final     Direct Measure HDL 07/11/2022 34 (L)  >=40 mg/dL Final     LDL Cholesterol Calculated 07/11/2022 60  <=110 mg/dL Final     Non HDL Cholesterol 07/11/2022 138 (H)  <120 mg/dL Final     Patient Fasting > 8hrs? 07/11/2022 Yes   Final     Sodium 07/11/2022 135  133 - 144 mmol/L Final     Potassium 07/11/2022 4.1  3.4 - 5.3 mmol/L Final     Chloride 07/11/2022 108  98 - 110 mmol/L Final     Carbon Dioxide (CO2) 07/11/2022 18 (L)  20 - 32 mmol/L Final     Anion Gap 07/11/2022 9  3 - 14 mmol/L Final     Urea Nitrogen 07/11/2022 10  7 - 21 mg/dL Final     Creatinine 07/11/2022 0.52  0.50 - 1.00 mg/dL Final     Calcium 07/11/2022 9.0  8.5 - 10.1 mg/dL Final    Calcium results for 1-18 y reported between 07/11/2021 and 1/27/2022  were evaluated against an outdated reference interval of 9.1-10.3 mg/dL rather than the intended reference interval of 8.5-10.1 mg/dL which is more representative of our healthy pediatric population. The calcium value itself was accurate but may not have been flagged correctly due to the outdated reference interval.     Glucose 07/11/2022 151 (H)  70 - 99 mg/dL Final     GFR Estimate 07/11/2022 >90  >60 mL/min/1.73m2 Final    Effective December 21, 2021 eGFRcr in adults is calculated using the 2021 CKD-EPI creatinine equation which includes age and gender (Maik et al., NEJ, DOI: 10.1056/ZECFbi2230425)     Vitamin D, Total (25-Hydroxy) 07/11/2022 37  20 - 75 ug/L Final     Ferritin 07/11/2022 156  26 - 388 ng/mL Final     Hemoglobin A1C 07/11/2022 7.2 (H)  0.0 - 5.6 % Final    Normal <5.7%   Prediabetes 5.7-6.4%    Diabetes 6.5% or higher     Note: Adopted from ADA consensus guidelines.     WBC Count 07/11/2022 6.3  4.0 - 11.0 10e3/uL Final     RBC Count 07/11/2022 5.99 (H)  4.40 - 5.90 10e6/uL Final     Hemoglobin 07/11/2022 18.4 (H)  13.3 - 17.7 g/dL Final     Hematocrit 07/11/2022 53.4 (H)  40.0 - 53.0 % Final     MCV 07/11/2022 89  78 - 100 fL Final     MCH 07/11/2022 30.7  26.5 - 33.0 pg Final     MCHC 07/11/2022 34.5  31.5 - 36.5 g/dL Final     RDW 07/11/2022 12.8  10.0 - 15.0 % Final     Platelet Count 07/11/2022 286  150 - 450 10e3/uL Final     % Neutrophils 07/11/2022 46  % Final     % Lymphocytes 07/11/2022 39  % Final     % Monocytes 07/11/2022 8  % Final     % Eosinophils 07/11/2022 6  % Final     % Basophils 07/11/2022 1  % Final     % Immature Granulocytes 07/11/2022 0  % Final     NRBCs per 100 WBC 07/11/2022 0  <1 /100 Final     Absolute Neutrophils 07/11/2022 3.0  1.6 - 8.3 10e3/uL Final     Absolute Lymphocytes 07/11/2022 2.5  0.8 - 5.3 10e3/uL Final     Absolute Monocytes 07/11/2022 0.5  0.0 - 1.3 10e3/uL Final     Absolute Eosinophils 07/11/2022 0.4  0.0 - 0.7 10e3/uL Final     Absolute  Basophils 2022 0.0  0.0 - 0.2 10e3/uL Final     Absolute Immature Granulocytes 2022 0.0  <=0.4 10e3/uL Final     Absolute NRBCs 2022 0.0  10e3/uL Final   Office Visit on 2022   Component Date Value Ref Range Status     Hemoglobin A1C 2022 7.3 (A)  0.0 - 5.7 % Final            Assessment and Plan:   Gadiel is a 17 year old male with Type 2 diabetes mellitus.  Glucose control is difficult to fully assess as Kavin only checks a fasting glucose level 2-3 times/week. With that being said, his fasting levels are not at goal as evidenced by hyperglycemia occurring 100% (goal <25%) of the time. I have discussed the start of lantus with mom in the past, but we will see what the increase in metformin does first. We discussed mental health today and I placed a referral, to help facilitate getting a new mental health provider.  Please refer to patient instructions for plan.    Diabetes Screening:  Thyroid (every 2 years): Due   Lipids (every 5 years age 10 and older): Due   Urine Microalbumin (annual): Due     Patient Instructions     It was nice to see you in clinic today.    Scheduling:    Pediatric Call Center [phone], option 1.    After Hours Emergency:  [phone].  Ask for the on-call doctor for pediatric endocrinology to be paged.    Diabetes nurses can be reached at [phone].  Fax: [phone]        Hemoglobin A1c  American Diabetes Association Goal A1c is <7.5%.   Your Most Recent A1c was:  Hemoglobin A1C   Date Value Ref Range Status   2022 7.2 (H) 0.0 - 5.6 % Final     Comment:     Normal <5.7%   Prediabetes 5.7-6.4%    Diabetes 6.5% or higher     Note: Adopted from ADA consensus guidelines.   2022 7.3 (A) 0.0 - 5.7 % Final   2021 7.0 (A) 0.0 - 5.7 % Final   2021 6.0 (H) 0.0 - 5.6 % Final     Comment:     Normal <5.7%   Prediabetes 5.7-6.4%    Diabetes 6.5% or higher     Note: Adopted from ADA consensus guidelines.    01/18/2021 8.6 (H) 0 - 5.6 % Final     Comment:     Results confirmed by repeat test  Normal <5.7% Prediabetes 5.7-6.4%  Diabetes 6.5% or higher - adopted from ADA   consensus guidelines.       Hemoglobin A1C POCT   Date Value Ref Range Status   06/02/2023 7.2 4.3 - <5.7 % Final   09/27/2022 7.6 4.3 - <5.7 % Final             Please follow-up in 3 months    Continue to focus on pre-meal dosing for all carbs    Continue to rotate injection sites    Based on glucose trends, consider the following:  Continue with the metformin ER 2000mg once daily    Ozempic 2 mg once per week    Continue to increase activity levels and reduce fried food intake.    Reminders:     Hyperglycemia (high blood glucose):         Ketones:  Check urine/blood ketones if Gaidel James is sick, vomiting, or if blood glucose is above 240 twice in a row. Call on-call endocrinologist or diabetes nurse if moderate to large ketones are present.     Hypoglycemia (low blood glucose):        Treatment of Hypoglycemia:    If blood glucose is 55-70:  1.         Eat or drink 1 carb unit (15 grams carbohydrate).              One carb unit equals:              - 1/2 cup (4 ounces) juice or regular soda pop, or              - 1 cup (8 ounces) milk, or              - 3 to 4 glucose tablets  2.         Re-check your blood glucose in 15 minutes.  3.         Repeat these steps every 15 minutes until your blood glucose is above 100.     If blood glucose is under 55:  1.         Eat or drink 2 carb units (30 grams carbohydrate).  Two carb units equal:              - 1 cup (8 ounces) juice or regular soda pop, or              - 2 cups (16 ounces) milk, or              - 6 to 8 glucose tablets.  2.         Re-check your blood glucose in 15 minutes.  3.         Repeat these steps every 15 minutes until your blood glucose is above 100.                       Diabetes is a complicated and dangerous illness which requires intensive monitoring and treatment to  prevent both short-term and long-term consequences to various organs. Inadequate management has an increased potential for serious long term effects on various organs, thus patients require intensive monitoring of therapy for safety and efficacy. While insulin therapy is life-saving, it is also associated with risks, such as life-threatening toxicity (hypoglycemia). Careful and continuous attention to balancing glucose levels, activity, diet and insul dosage is necessary.       Thank you for allowing me to participate in the care of your patient.  Please do not hesitate to call with questions or concerns.      Sincerely,  Nida Angel, MSN, CPNP, [facility]  Pediatric Nurse Practitioner  [facility]  Pediatric Enocrinology    CC      Copy to patient  Meka Rossi (CO GUARDIAN TO 8-) Landon Smith (CO GUARDIAN TO 8-)  [address] [place] [address]      Start of visit: 10:07AM and End of visit: 10:22AM    I spent a total of 35 minutes on the date of the encounter in chart review, patient visit and documentation. Please see the note for further information on patient assessment and treatment.

## 2023-06-02 ENCOUNTER — OFFICE VISIT (OUTPATIENT)
Dept: PEDIATRICS | Facility: CLINIC | Age: 18
End: 2023-06-02
Attending: NURSE PRACTITIONER
Payer: COMMERCIAL

## 2023-06-02 VITALS
DIASTOLIC BLOOD PRESSURE: 80 MMHG | BODY MASS INDEX: 34.83 KG/M2 | HEIGHT: 73 IN | HEART RATE: 99 BPM | WEIGHT: 262.79 LBS | SYSTOLIC BLOOD PRESSURE: 124 MMHG

## 2023-06-02 DIAGNOSIS — E11.8 TYPE 2 DIABETES MELLITUS WITH COMPLICATION, WITHOUT LONG-TERM CURRENT USE OF INSULIN (H): ICD-10-CM

## 2023-06-02 DIAGNOSIS — E66.01 SEVERE OBESITY DUE TO EXCESS CALORIES WITHOUT SERIOUS COMORBIDITY WITH BODY MASS INDEX (BMI) GREATER THAN 99TH PERCENTILE FOR AGE IN PEDIATRIC PATIENT (H): ICD-10-CM

## 2023-06-02 DIAGNOSIS — E11.9 TYPE 2 DIABETES MELLITUS WITHOUT COMPLICATION, WITHOUT LONG-TERM CURRENT USE OF INSULIN (H): Primary | ICD-10-CM

## 2023-06-02 DIAGNOSIS — E78.1 HYPERTRIGLYCERIDEMIA: ICD-10-CM

## 2023-06-02 DIAGNOSIS — F84.0 ACTIVE AUTISTIC DISORDER: ICD-10-CM

## 2023-06-02 LAB — HBA1C MFR BLD: 7.2 % (ref 4.3–?)

## 2023-06-02 PROCEDURE — G0463 HOSPITAL OUTPT CLINIC VISIT: HCPCS | Performed by: NURSE PRACTITIONER

## 2023-06-02 PROCEDURE — 99214 OFFICE O/P EST MOD 30 MIN: CPT | Performed by: NURSE PRACTITIONER

## 2023-06-02 PROCEDURE — 83036 HEMOGLOBIN GLYCOSYLATED A1C: CPT | Performed by: NURSE PRACTITIONER

## 2023-06-02 RX ORDER — BLOOD SUGAR DIAGNOSTIC
STRIP MISCELLANEOUS
Qty: 150 STRIP | Refills: 6 | Status: SHIPPED | OUTPATIENT
Start: 2023-06-02

## 2023-06-02 RX ORDER — LANCETS
EACH MISCELLANEOUS
Qty: 102 EACH | Refills: 3 | Status: SHIPPED | OUTPATIENT
Start: 2023-06-02

## 2023-06-02 ASSESSMENT — PATIENT HEALTH QUESTIONNAIRE - PHQ9: SUM OF ALL RESPONSES TO PHQ QUESTIONS 1-9: 4

## 2023-06-02 NOTE — NURSING NOTE
"Informant-    Gadiel is accompanied by father    Reason for Visit-  Follow up    Vitals signs-  /80   Pulse 99   Ht 1.862 m (6' 1.31\")   Wt 119.2 kg (262 lb 12.6 oz)   BMI 34.38 kg/m      There are concerns about the child's exposure to violence in the home: No    Need Flu Shot: No    Need MyChart: No    Does the patient need any medication refills today? No    Face to Face time: 5 Minutes  Tessa Salinas MA      "

## 2023-06-02 NOTE — PROGRESS NOTES
Depression Response    Patient completed the PHQ-9 assessment for depression and scored >9? No  Question 9 on the PHQ-9 was positive for suicidality? Yes  Does patient have current mental health provider? Yes    Is this a virtual visit? No    I personally notified the following: visit provider

## 2023-06-02 NOTE — PATIENT INSTRUCTIONS
It was nice to see you in clinic today.    Scheduling:    Pediatric Call Center [phone], option 1.    After Hours Emergency:  [phone].  Ask for the on-call doctor for pediatric endocrinology to be paged.    Diabetes nurses can be reached at [phone].  Fax: [phone]        Hemoglobin A1c  American Diabetes Association Goal A1c is <7.5%.   Your Most Recent A1c was:  Hemoglobin A1C   Date Value Ref Range Status   2022 7.2 (H) 0.0 - 5.6 % Final     Comment:     Normal <5.7%   Prediabetes 5.7-6.4%    Diabetes 6.5% or higher     Note: Adopted from ADA consensus guidelines.   2022 7.3 (A) 0.0 - 5.7 % Final   2021 7.0 (A) 0.0 - 5.7 % Final   2021 6.0 (H) 0.0 - 5.6 % Final     Comment:     Normal <5.7%   Prediabetes 5.7-6.4%    Diabetes 6.5% or higher     Note: Adopted from ADA consensus guidelines.   2021 8.6 (H) 0 - 5.6 % Final     Comment:     Results confirmed by repeat test  Normal <5.7% Prediabetes 5.7-6.4%  Diabetes 6.5% or higher - adopted from ADA   consensus guidelines.       Hemoglobin A1C POCT   Date Value Ref Range Status   2023 7.2 4.3 - <5.7 % Final   2022 7.6 4.3 - <5.7 % Final             Please follow-up in 3 months    Continue to focus on pre-meal dosing for all carbs    Continue to rotate injection sites    Based on glucose trends, consider the following:  Continue with the metformin ER 2000mg once daily    Ozempic 2 mg once per week    Continue to increase activity levels and reduce fried food intake.    Reminders:     Hyperglycemia (high blood glucose):         Ketones:  Check urine/blood ketones if Gadiel James is sick, vomiting, or if blood glucose is above 240 twice in a row. Call on-call endocrinologist or diabetes nurse if moderate to large ketones are present.     Hypoglycemia (low blood glucose):        Treatment of Hypoglycemia:    If blood glucose is 55-70:  1.         Eat or drink 1 carb unit (15 grams  carbohydrate).              One carb unit equals:              - 1/2 cup (4 ounces) juice or regular soda pop, or              - 1 cup (8 ounces) milk, or              - 3 to 4 glucose tablets  2.         Re-check your blood glucose in 15 minutes.  3.         Repeat these steps every 15 minutes until your blood glucose is above 100.     If blood glucose is under 55:  1.         Eat or drink 2 carb units (30 grams carbohydrate).  Two carb units equal:              - 1 cup (8 ounces) juice or regular soda pop, or              - 2 cups (16 ounces) milk, or              - 6 to 8 glucose tablets.  2.         Re-check your blood glucose in 15 minutes.  3.         Repeat these steps every 15 minutes until your blood glucose is above 100.

## 2023-10-24 DIAGNOSIS — E66.01 SEVERE OBESITY DUE TO EXCESS CALORIES WITHOUT SERIOUS COMORBIDITY WITH BODY MASS INDEX (BMI) GREATER THAN 99TH PERCENTILE FOR AGE IN PEDIATRIC PATIENT (H): ICD-10-CM

## 2023-10-24 DIAGNOSIS — E11.9 TYPE 2 DIABETES MELLITUS WITHOUT COMPLICATION, WITHOUT LONG-TERM CURRENT USE OF INSULIN (H): ICD-10-CM

## 2023-10-24 RX ORDER — SEMAGLUTIDE 2.68 MG/ML
2 INJECTION, SOLUTION SUBCUTANEOUS
Qty: 3 ML | Refills: 6 | Status: SHIPPED | OUTPATIENT
Start: 2023-10-24 | End: 2023-11-06

## 2023-10-27 ENCOUNTER — LAB (OUTPATIENT)
Dept: LAB | Facility: CLINIC | Age: 18
End: 2023-10-27
Payer: COMMERCIAL

## 2023-10-27 DIAGNOSIS — F84.0 AUTISM: ICD-10-CM

## 2023-10-27 DIAGNOSIS — F90.9 ADHD (ATTENTION DEFICIT HYPERACTIVITY DISORDER): ICD-10-CM

## 2023-10-27 DIAGNOSIS — E11.9 TYPE 2 DIABETES MELLITUS WITHOUT COMPLICATION, WITHOUT LONG-TERM CURRENT USE OF INSULIN (H): Primary | ICD-10-CM

## 2023-10-27 DIAGNOSIS — F90.9 ATTENTION DEFICIT HYPERACTIVITY DISORDER: ICD-10-CM

## 2023-10-27 DIAGNOSIS — F41.9 ANXIETY: ICD-10-CM

## 2023-10-27 LAB
ALBUMIN SERPL BCG-MCNC: 4.6 G/DL (ref 3.5–5.2)
ALP SERPL-CCNC: 95 U/L (ref 40–129)
ALT SERPL W P-5'-P-CCNC: 95 U/L (ref 0–70)
ANION GAP SERPL CALCULATED.3IONS-SCNC: 12 MMOL/L (ref 7–15)
AST SERPL W P-5'-P-CCNC: 51 U/L (ref 0–45)
BILIRUB DIRECT SERPL-MCNC: <0.2 MG/DL (ref 0–0.3)
BILIRUB SERPL-MCNC: 0.5 MG/DL
BUN SERPL-MCNC: 8.3 MG/DL (ref 6–20)
CALCIUM SERPL-MCNC: 9.4 MG/DL (ref 8.6–10)
CHLORIDE SERPL-SCNC: 104 MMOL/L (ref 98–107)
CHOLEST SERPL-MCNC: 164 MG/DL
CREAT SERPL-MCNC: 0.62 MG/DL (ref 0.67–1.17)
DEPRECATED HCO3 PLAS-SCNC: 24 MMOL/L (ref 22–29)
EGFRCR SERPLBLD CKD-EPI 2021: >90 ML/MIN/1.73M2
GLUCOSE SERPL-MCNC: 200 MG/DL (ref 70–99)
HBA1C MFR BLD: 8.1 % (ref 0–5.6)
HDLC SERPL-MCNC: 34 MG/DL
LDLC SERPL CALC-MCNC: 64 MG/DL
NONHDLC SERPL-MCNC: 130 MG/DL
POTASSIUM SERPL-SCNC: 4.5 MMOL/L (ref 3.4–5.3)
PROT SERPL-MCNC: 7.4 G/DL (ref 6.4–8.3)
SODIUM SERPL-SCNC: 140 MMOL/L (ref 135–145)
T4 FREE SERPL-MCNC: 1.16 NG/DL (ref 0.9–1.7)
TRIGL SERPL-MCNC: 331 MG/DL
TSH SERPL DL<=0.005 MIU/L-ACNC: 1.84 UIU/ML (ref 0.3–4.2)
VIT D+METAB SERPL-MCNC: 28 NG/ML (ref 20–50)

## 2023-10-27 PROCEDURE — 82306 VITAMIN D 25 HYDROXY: CPT

## 2023-10-27 PROCEDURE — 80053 COMPREHEN METABOLIC PANEL: CPT

## 2023-10-27 PROCEDURE — 36415 COLL VENOUS BLD VENIPUNCTURE: CPT

## 2023-10-27 PROCEDURE — 82248 BILIRUBIN DIRECT: CPT

## 2023-10-27 PROCEDURE — 80061 LIPID PANEL: CPT

## 2023-10-27 PROCEDURE — 84439 ASSAY OF FREE THYROXINE: CPT

## 2023-10-27 PROCEDURE — 84443 ASSAY THYROID STIM HORMONE: CPT

## 2023-10-27 PROCEDURE — 83036 HEMOGLOBIN GLYCOSYLATED A1C: CPT

## 2023-11-02 ENCOUNTER — TELEPHONE (OUTPATIENT)
Dept: ENDOCRINOLOGY | Facility: CLINIC | Age: 18
End: 2023-11-02
Payer: COMMERCIAL

## 2023-11-02 NOTE — TELEPHONE ENCOUNTER
Called patient and left voicemail. Patient has appointment on  11/6/23 . Need to collect 14 days of blood sugar readings if patient is using meter, or if patient is using CGM, need to get patients device uploaded to retrieve report for provider to review.  Daly Delarosa MA

## 2023-11-02 NOTE — PROGRESS NOTES
Outcome for 11/02/23 2:35 PM: Left Voicemail   Daly Delarosa MA  Outcome for 11/03/23 1:15 PM: Carolinao emailed to provider. Data from 10/13/23-10/26/23. Will have current readings to give during rooming at appointment time.   Daly Delarosa MA  Outcome for 11/06/23 9:40 AM: Data obtained via phone and located below; pt has been off of Ozempic.  Tiffany Isabel LPN     11/5  AM- 226    11/4  AM- 237    10/29  AM- 185    10/28  AM- 183

## 2023-11-03 NOTE — TELEPHONE ENCOUNTER
Spoke with patient's mother/guardian, meka. Clinic uploaded meter to [facility] last week. Meka does not have current readings with her and will have them ready at time of appointment. Daly Delarosa CMA on 11/3/2023 at 1:19 PM

## 2023-11-06 ENCOUNTER — VIRTUAL VISIT (OUTPATIENT)
Dept: ENDOCRINOLOGY | Facility: CLINIC | Age: 18
End: 2023-11-06
Payer: COMMERCIAL

## 2023-11-06 DIAGNOSIS — E11.9 TYPE 2 DIABETES MELLITUS WITHOUT COMPLICATION, WITHOUT LONG-TERM CURRENT USE OF INSULIN (H): ICD-10-CM

## 2023-11-06 DIAGNOSIS — E66.01 SEVERE OBESITY DUE TO EXCESS CALORIES WITHOUT SERIOUS COMORBIDITY WITH BODY MASS INDEX (BMI) GREATER THAN 99TH PERCENTILE FOR AGE IN PEDIATRIC PATIENT (H): ICD-10-CM

## 2023-11-06 DIAGNOSIS — E11.8 TYPE 2 DIABETES MELLITUS WITH COMPLICATION, WITHOUT LONG-TERM CURRENT USE OF INSULIN (H): Primary | ICD-10-CM

## 2023-11-06 PROCEDURE — 99214 OFFICE O/P EST MOD 30 MIN: CPT | Mod: VID | Performed by: INTERNAL MEDICINE

## 2023-11-06 RX ORDER — SEMAGLUTIDE 2.68 MG/ML
2 INJECTION, SOLUTION SUBCUTANEOUS
Qty: 3 ML | Refills: 6 | Status: SHIPPED | OUTPATIENT
Start: 2023-11-06 | End: 2024-05-16

## 2023-11-06 ASSESSMENT — PAIN SCALES - GENERAL: PAINLEVEL: NO PAIN (0)

## 2023-11-06 NOTE — PATIENT INSTRUCTIONS
-[facility]  Dr Johnson, Endocrinology Department    [facility]   [address] [facility]  [place] [address]  Appointment [phone]  Fax: [phone]  [place]: Monday - Thursday      Continue current diabetes regimen-metformin XR 2000 mg/day and Ozempic 2 mg/week  Restart Ozempic as soon as possible  Consider 15-day diagnostic sensor.  Please inform us if you need diabetic educator referral    Follow up with Sharon ARCEO in 3 months with labs prior.  ( She is a new Physician assistant who is at [facility] on Monday and  and will see follow up Diabetes patients)  Repeat labs and follow up with  in 6 months.  Please make a lab appointment for blood work and follow up clinic appointment in 1 week after that to discuss results.    Recommend checking blood sugars before meals and at bedtime.    If Blood glucose are low more often-> 2-3 times/week- give us a call.  Make better food choices: reduce carbs, Reduce portion size, weight loss and exercise 3-4 times a week.    What is hypoglycemia:  Hypoglycemia is when blood sugar levels become too low - below 70 m/dl.      What causes hypoglycemia?  - using too much insulin  -taking too many diabetes pills  -not eating enough, or skipping meals or snacks  -not eating enough carbohydrate with meals  -changing your exercise routine  -drinking alcohol in excess    It is also possible to have hypoglycemia even when you are carefully managing your blood sugar levels.    What does it feel like when blood sugars get too low?  You may feel:  - anxious  -confused  -dizzy  -hungry  -light-headed  -nervous  -shaky  -sleepy  -sweaty    You may have  -blurred or cloudy vision  -heart palpitations (heart skips a beat or races)  -tingling or numbness around the mouth and tongue  -tremors    What to do if you have symptoms of hypoglycmemia:  If you think your blood sugar is too low, check it with a  glucose meter.  If its below 70 mg/dl, consume one of the following:  Fruit juice (1/2 cup)  Glucose tablets (15 grams)  Hard candy (5 to 7 pieces)  Honey or sugar (2 teaspoons)  Milk (1/2 cup)  Soft drink (non-diet, 1/2 cup)    Wait 15 minutes and check your blood glucose again.  IF it is still below 70 mg/dl, have another food item listed above. Wait another 15 minutes and repeat the blood glucose test.  Have a small meal or snack that contains some carbohydrate after your blood glucose rises above 70 mg/dl.    If you are at risk of hypoglycemia, always carry with you glucose tablets or one of the foods listed above.      To prevent Hypoglycemia:  Avoid situations that may cause hypoglycemia  Before making any change to your diet or exercise routine, discuss them with your healthcare provider  Keep a record of your blood glucose levels.  Include the time of day, diabetes medications, when you had your last meal or snack, and what you were doing at the time (e.g. Watching TV, gardening, jogging, etc).    Talk to your healthcare provider if your blood glucose levels are often low        Patient guide on hypoglycemia    http://www.hormone.org/Resources/upload/patient-guide-diagnosis-and-management-hypoglycemia-[email or web address].pdf

## 2023-11-06 NOTE — NURSING NOTE
Is the patient currently in the state of [place]? YES    Visit mode:VIDEO    If the visit is dropped, the patient can be reconnected by: VIDEO VISIT: Text to cell phone:   Telephone Information:   Mobile [phone]       Will anyone else be joining the visit? NO  (If patient encounters technical issues they should call [phone]    How would you like to obtain your AVS? MyChart    Are changes needed to the allergy or medication list? Pt stated no changes to allergies and Pt stated no med changes    Reason for visit: Consult    Tiffany Isabel LPN LPN

## 2023-11-06 NOTE — LETTER
"    11/6/2023         RE: Gadiel James  [address] [place] [address]        Dear Colleague,    Thank you for referring your patient, Gadiel James, to the [facility]. Please see a copy of my visit note below.    Outcome for 11/02/23 2:35 PM: Left Voicemail   Daly Delarosa MA  Outcome for 11/03/23 1:15 PM: Fadia emailed to provider. Data from 10/13/23-10/26/23. Will have current readings to give during rooming at appointment time.   Daly Delarosa MA  Outcome for 11/06/23 9:40 AM: Data obtained via phone and located below; pt has been off of Ozempic.  Tiffany Isabel LPN     11/5  AM- 226    11/4  AM- 237    10/29  AM- 185    10/28  AM- 183        THIS IS A VIDEO VISIT:    Phone call visit/virtual visit encounter:    Name of patient: Gadiel James    Date of encounter: 11/6/2023    Time of start of video visit: 10:01    Video started: 10:12    Video ended: 10:29    Provider location: working from home/ [facility]    Patient location: patients home.    Mode of transmission: [employer] video/ [employer]    Verbal consent: obtained before starting visit. Pt is agreeable.      The patient has been notified of following:      \"This VIDEO visit will be conducted via a call between you and your physician/provider. We have found that certain health care needs can be provided without the need for a physical exam.  This service lets us provide the care you need with a short phone conversation.  If a prescription is necessary we can send it directly to your pharmacy.  If lab work is needed we can place an order for that and you can then stop by our lab to have the test done at a later time.     With new updates with corona virus patient might be billed as clinic visit.     If during the course of the call the physician/provider feels a telephone visit is not appropriate, you will not be charged for this service.\"      Past medical history, social history, family " history, allergy and medications were reviewed and updated as appropriate.  Reviewed pertinent labs, notes, imaging studies personally.    ENDOCRINOLOGY CLINIC NOTE:  Name: Gadiel James  Self referral for Diabetes. CARLA from pediatric endocrinology.  HPI:  Gadiel James is a 18 year old male who presents for the evaluation/management of Diabetes.   has a past medical history of ADHD (attention deficit hyperactivity disorder), Autism spectrum disorder, and Type 2 diabetes mellitus with hyperglycemia (H).    CARLA from pediatric endocrinology.  Was seen by  and .  His mother reports that he does not want to talk about diet and lifestyle as that makes him depressed.  He is in between jobs.  He is in transitional program through school. He finished high school.  He lives with his parents.  He has autism. He has social difficulties- does not like to go to appointment.  He is in robotics.    He ran out of Ozempic 2-3 weeks back as it was backordered.    1. Type 2 DM:  Orginally diagnosed at the age of: [age]?    Current Regimen:   Metformin 2000 mg/day  Ozempic 2.0 mg /week    ------  Ozempic/Semaglutide was started in Sept 2020  He was on Bydureon previously     BS checks: mostly FBG  Average Meter Download: Blood glucose data reviewed personally. See nursing note from this encounter for details.    Exercise: trying to be active.  Last A1c:  Symptoms of hypoglycemia (low blood sugar):  Gets symptoms of hypoglycemia.  Episodes of hypoglycemia: rare    DM Complications:   Complications:   Diabetes Complications  Description / Detail    Diabetic Retinopathy  No   CAD / PAD  No   Neuropathy  No   Nephropathy / Microalbuminuria  No  Lab Results   Component Value Date    UMALCR 10.17 09/07/2021         Gastroparesis  No   Hypoglycemia [place]arness  No     2. Hypertension:   Not on medications  3. Hyperlipidemia: Not on medications. Has high TG.    PMH/PSH:  Past Medical History:   Diagnosis Date      ADHD (attention deficit hyperactivity disorder)      Autism spectrum disorder      Type 2 diabetes mellitus with hyperglycemia (H)      Past Surgical History:   Procedure Laterality Date     ENT SURGERY       MYRINGOTOMY, INSERT TUBE BILATERAL, COMBINED      x2     MYRINGOTOMY, INSERT TUBE(S), ADENOIDECTOMY, COMBINED  10/20/2011    Procedure:COMBINED MYRINGOTOMY, INSERT TUBE BILATERAL, ADENOIDECTOMY;  MYRINGOTOMY, INSERT TUBE BILATERAL, ADENOID Revision ; Surgeon:RASHAWN ALBRECHT; Location:[facility] OR     TONSILLECTOMY, ADENOIDECTOMY, COMBINED       Family Hx:  Family History   Adopted: Yes   Problem Relation Age of Onset     Family History Negative Mother      Family History Negative Father        Diabetes:    Social Hx:  Social History     Socioeconomic History     Marital status: Single     Spouse name: Not on file     Number of children: Not on file     Years of education: Not on file     Highest education level: Not on file   Occupational History     Not on file   Tobacco Use     Smoking status: Never     Passive exposure: Never     Smokeless tobacco: Never   Vaping Use     Vaping Use: Never used   Substance and Sexual Activity     Alcohol use: No     Drug use: No     Sexual activity: Not on file   Other Topics Concern     Not on file   Social History Narrative    7/21/2020: Kavin lives with his parents. He's an only child. He's going into 11th grade in the fall. He likes [employer], gardening, cooking, and video games. He collect video consols.         11/17/2020: Kavin lives with his parents. He's in 11th grade and is doing online schooling now due to the COVID-19 pandemic. He is not happy with it. He's getting a 3 D printer and wants a camping oven top for [place].         1/19/2021: Kavin lives with his parents in [place], [place]. He's in 11th grade (online schooling due to the COVID-19 pandemic, although he will be going back to clinic next week). He got his 3 D printer ([employer]).        3/23/21: Kavin is adopted, so family  history is unknown. He is in the 11th grade for the 0719-1970 school year. He has been enjoying his 3D printer.         12/14/21: Kavin is living at home with adoptive parents.        2/8/22: Kavin is homebound for school at this time.        6/2/23: Kavin is working at [employer] this summer. He lives at home with both mom and dad.     Social Determinants of Health     Financial Resource Strain: Not on file   Food Insecurity: Not on file   Transportation Needs: Not on file   Physical Activity: Not on file   Stress: Not on file   Social Connections: Not on file   Interpersonal Safety: Not on file   Housing Stability: Not on file          MEDICATIONS:  has a current medication list which includes the following prescription(s): accu-chek guide, blood glucose monitoring, buspirone, escitalopram, guanfacine hcl, insulin pen needle, latuda, metformin, pediatric multivitamins-iron, risperidone, semaglutide (1 mg/dose), semaglutide (1 mg/dose), ozempic (2 mg/dose), vitamin d3, semaglutide (1 mg/dose), and trazodone.    ROS     ROS: 10 point ROS neg other than the symptoms noted above in the HPI.    Physical Exam   VS: There were no vitals taken for this visit.  GENERAL: healthy, alert and no distress  EYES: Eyes grossly normal to inspection, conjunctivae and sclerae normal  ENT: no nose swelling, nasal discharge.  Thyroid: no apparent thyroid nodules  RESP: no audible wheeze, cough, or visible cyanosis.  No visible retractions or increased work of breathing.  Able to speak fully in complete sentences.  ABDO: not evaluated.  EXTREMITIES: no hand tremors.  NEURO: Cranial nerves grossly intact, mentation intact and speech normal  SKIN: No apparent skin lesions, rash or edema seen   PSYCH: mentation appears normal, affect normal/bright, judgement and insight intact, normal speech and appearance well-groomed      LABS:  A1c:  Lab Results   Component Value Date    A1C 8.1 10/27/2023    A1C 7.2 07/11/2022    A1C 7.3 06/28/2022    A1C  "7.0 12/14/2021    A1C 6.0 09/07/2021    A1C 8.6 01/18/2021    A1C 7.8 11/04/2020    A1C 7.2 02/08/2020       Creatinine:  Creatinine   Date Value Ref Range Status   10/27/2023 0.62 (L) 0.67 - 1.17 mg/dL Final   01/18/2021 0.56 0.50 - 1.00 mg/dL Final   ]    Urine Micro:  Lab Results   Component Value Date    MICROL 6 09/07/2021     No results found for: \"MICROALBUMIN\"      LFTs/Lipids:  Recent Labs   Lab Test 10/27/23  1022 07/11/22  1432   CHOL 164 172*   HDL 34* 34*   LDL 64 60   TRIG 331* 390*     TFTs:  TSH   Date Value Ref Range Status   10/27/2023 1.84 0.30 - 4.20 uIU/mL Final   07/11/2022 2.05 0.40 - 4.00 mU/L Final   01/18/2021 2.61 0.40 - 4.00 mU/L Final     All pertinent notes, labs, and images personally reviewed by me.     Glucometer/ insulin pump (if applicable)/ CGM data (if applicable) downloaded, Personally reviewed and interpreted.  All Blood sugar data reviewed personally and discussed with pt.  See nursing note from 11/6/2023 for details of BG/CGM log.      A/P  Mr.Alexander RADHA James is a 18 year old here for the evaluation/management of diabetes:    1. DM2 - Under fair control.  A1c 8.1%  No known DM related complications.  FBG mostly high.  Limited data available.  Plan:  Discussed diagnosis, pathophysiology, management and treatment options of condition with pt.  I also discussed importance of strict blood sugar control to prevent complications associated with uncontrolled diabetes.  Continue current diabetes regimen-metformin XR 2000 mg/day and Ozempic 2 mg/week  Restart Ozempic as soon as possible  Consider 15-day diagnostic sensor.  Please inform us if you need diabetic educator referral  Follow up with Sharon ARCEO in 3 months with labs prior.   Repeat labs and follow up with  in 6 months.  Please make a lab appointment for blood work and follow up clinic appointment in 1 week after that to discuss results.     2. Hypertension - Under Good control.  Not on medication    3. " Hyperlipidemia - Under Good control.  + High triglyceride levels.  Not on medication    4. Prevention:  Opthalmology-recommend yearly  ASA-not indicated secondary to age  Smoking-no    Most Recent Immunizations   Administered Date(s) Administered     COVID-19 Bivalent 12+ (Pfizer) 10/15/2022     COVID-19 MONOVALENT 12+ (Pfizer) 11/26/2021     DTAP (<7y) 08/14/2008     FLU 6-35 months 11/30/2009     HEPA 09/14/2015     HEPATITIS A (PEDS 12M-18Y) 08/28/2014     HIB (PRP-T) 08/12/2004     HIB(PRP-OMP)(PedvaxHIB) 08/12/2004     HPV9 10/23/2020     HepB 05/19/2004     Hepatitis A (ADULT 19+) 09/14/2015     Hepatitis B, Peds 05/19/2004     Influenza (H1N1) 11/30/2009     Influenza (IIV3) PF 01/17/2013     Influenza Vaccine >6 months (Alfuria,Fluzone) 10/15/2022     MMR 08/14/2008     Meningococcal ACWY (Menactra ) 08/29/2019     Meningococcal ACWY (Menveo ) 08/28/2014     Meningococcal B (Bexsero ) 10/23/2020     Pneumococcal (PCV 7) 02/22/2005     Poliovirus, inactivated (IPV) 08/14/2008     TDAP Vaccine (Boostrix) 08/26/2013     Varicella 08/14/2008         Recommend checking blood sugars before meals and at bedtime.    If Blood glucose are low more often-> 2-3 times/week- give us a call.  The patient is advised to Make better food choices: reduce carbs, Reduce portion size, weight loss and exercise 3-4 times a week.  Discussed hypoglycemia signs and symptoms as well as management in detail.    There is some variability among people, most will usually develop symptoms suggestive of hypoglycemia when blood glucose levels are lowered to the mid 60's. The first set of symptoms are called adrenergic. Patients may experience any of the following nervousness, sweating, intense hunger, trembling, weakness, palpitations, and difficulty speaking.   The acute management of hypoglycemia involves the rapid delivery of a source of easily absorbed sugar. Regular soda, juice, lifesavers, table sugar, are good options. 15 grams of  glucose is the dose that is given, followed by an assessment of symptoms and a blood glucose check if possible. If after 10 minutes there is no improvement, another 10-15 grams should be given. This can be repeated up to three times. The equivalency of 10-15 grams of glucose (approximate servings) are: Four lifesavers, 4 teaspoons of sugar, or 1/2 can of regular soda or juice.     Labs ordered today: No orders of the defined types were placed in this encounter.      Radiology/Consults ordered today: None    Meds ordered today: No orders of the defined types were placed in this encounter.      There are no discontinued medications.  All questions were answered.  The patient indicates understanding of the above issues and agrees with the plan set forth.     Follow-up:  As noted in AVS    Yoli Johnson M.D  Endocrinology  [facility]/[place]  CC: No Ref-Primary, Physician    Disclaimer: This note consists of symbols derived from keyboarding, dictation and/or voice recognition software. As a result, there may be errors in the script that have gone undetected. Please consider this when interpreting information found in this chart.    Addendum to above note and clinic visit:    Labs reviewed.    See result note/telephone encounter.          Again, thank you for allowing me to participate in the care of your patient.        Sincerely,        Yoli Johnson MD

## 2023-11-06 NOTE — PROGRESS NOTES
"THIS IS A VIDEO VISIT:    Phone call visit/virtual visit encounter:    Name of patient: Gadiel James    Date of encounter: 11/6/2023    Time of start of video visit: 10:01    Video started: 10:12    Video ended: 10:29    Provider location: working from home/ [facility]    Patient location: patients home.    Mode of transmission: [employer] video/ [employer]    Verbal consent: obtained before starting visit. Pt is agreeable.      The patient has been notified of following:      \"This VIDEO visit will be conducted via a call between you and your physician/provider. We have found that certain health care needs can be provided without the need for a physical exam.  This service lets us provide the care you need with a short phone conversation.  If a prescription is necessary we can send it directly to your pharmacy.  If lab work is needed we can place an order for that and you can then stop by our lab to have the test done at a later time.     With new updates with corona virus patient might be billed as clinic visit.     If during the course of the call the physician/provider feels a telephone visit is not appropriate, you will not be charged for this service.\"      Past medical history, social history, family history, allergy and medications were reviewed and updated as appropriate.  Reviewed pertinent labs, notes, imaging studies personally.    ENDOCRINOLOGY CLINIC NOTE:  Name: Gadiel James  Self referral for Diabetes. CARLA from pediatric endocrinology.  HPI:  Gadiel James is a 18 year old male who presents for the evaluation/management of Diabetes.   has a past medical history of ADHD (attention deficit hyperactivity disorder), Autism spectrum disorder, and Type 2 diabetes mellitus with hyperglycemia (H).    CARLA from pediatric endocrinology.  Was seen by  and .  His mother reports that he does not want to talk about diet and lifestyle as that makes him depressed.  He is in " between jobs.  He is in transitional program through school. He finished high school.  He lives with his parents.  He has autism. He has social difficulties- does not like to go to appointment.  He is in robotics.    He ran out of Ozempic 2-3 weeks back as it was backordered.    1. Type 2 DM:  Orginally diagnosed at the age of: [age]?    Current Regimen:   Metformin 2000 mg/day  Ozempic 2.0 mg /week    ------  Ozempic/Semaglutide was started in Sept 2020  He was on Bydureon previously     BS checks: mostly FBG  Average Meter Download: Blood glucose data reviewed personally. See nursing note from this encounter for details.    Exercise: trying to be active.  Last A1c:  Symptoms of hypoglycemia (low blood sugar):  Gets symptoms of hypoglycemia.  Episodes of hypoglycemia: rare    DM Complications:   Complications:   Diabetes Complications  Description / Detail    Diabetic Retinopathy  No   CAD / PAD  No   Neuropathy  No   Nephropathy / Microalbuminuria  No  Lab Results   Component Value Date    UMALCR 10.17 09/07/2021         Gastroparesis  No   Hypoglycemia [place]  No     2. Hypertension:   Not on medications  3. Hyperlipidemia: Not on medications. Has high TG.    PMH/PSH:  Past Medical History:   Diagnosis Date    ADHD (attention deficit hyperactivity disorder)     Autism spectrum disorder     Type 2 diabetes mellitus with hyperglycemia (H)      Past Surgical History:   Procedure Laterality Date    ENT SURGERY      MYRINGOTOMY, INSERT TUBE BILATERAL, COMBINED      x2    MYRINGOTOMY, INSERT TUBE(S), ADENOIDECTOMY, COMBINED  10/20/2011    Procedure:COMBINED MYRINGOTOMY, INSERT TUBE BILATERAL, ADENOIDECTOMY;  MYRINGOTOMY, INSERT TUBE BILATERAL, ADENOID Revision ; Surgeon:RASHAWN ALBRECHT; Location: OR    TONSILLECTOMY, ADENOIDECTOMY, COMBINED       Family Hx:  Family History   Adopted: Yes   Problem Relation Age of Onset    Family History Negative Mother     Family History Negative Father        Diabetes:    Social  Hx:  Social History     Socioeconomic History    Marital status: Single     Spouse name: Not on file    Number of children: Not on file    Years of education: Not on file    Highest education level: Not on file   Occupational History    Not on file   Tobacco Use    Smoking status: Never     Passive exposure: Never    Smokeless tobacco: Never   Vaping Use    Vaping Use: Never used   Substance and Sexual Activity    Alcohol use: No    Drug use: No    Sexual activity: Not on file   Other Topics Concern    Not on file   Social History Narrative    7/21/2020: Kavin lives with his parents. He's an only child. He's going into 11th grade in the fall. He likes [employer], gardening, cooking, and video games. He collect video consols.         11/17/2020: Kavin lives with his parents. He's in 11th grade and is doing online schooling now due to the COVID-19 pandemic. He is not happy with it. He's getting a 3 D printer and wants a camping oven top for [place].         1/19/2021: Kavin lives with his parents in [place], [place]. He's in 11th grade (online schooling due to the COVID-19 pandemic, although he will be going back to clinic next week). He got his 3 D printer ([employer]).        3/23/21: Kavin is adopted, so family history is unknown. He is in the 11th grade for the 2972-7099 school year. He has been enjoying his 3D printer.         12/14/21: Kavin is living at home with adoptive parents.        2/8/22: Kavin is homebound for school at this time.        6/2/23: Kavin is working at [employer] this summer. He lives at home with both mom and dad.     Social Determinants of Health     Financial Resource Strain: Not on file   Food Insecurity: Not on file   Transportation Needs: Not on file   Physical Activity: Not on file   Stress: Not on file   Social Connections: Not on file   Interpersonal Safety: Not on file   Housing Stability: Not on file          MEDICATIONS:  has a current medication list which includes the following prescription(s):  "accu-chek guide, blood glucose monitoring, buspirone, escitalopram, guanfacine hcl, insulin pen needle, latuda, metformin, pediatric multivitamins-iron, risperidone, semaglutide (1 mg/dose), semaglutide (1 mg/dose), ozempic (2 mg/dose), vitamin d3, semaglutide (1 mg/dose), and trazodone.    ROS     ROS: 10 point ROS neg other than the symptoms noted above in the HPI.    Physical Exam   VS: There were no vitals taken for this visit.  GENERAL: healthy, alert and no distress  EYES: Eyes grossly normal to inspection, conjunctivae and sclerae normal  ENT: no nose swelling, nasal discharge.  Thyroid: no apparent thyroid nodules  RESP: no audible wheeze, cough, or visible cyanosis.  No visible retractions or increased work of breathing.  Able to speak fully in complete sentences.  ABDO: not evaluated.  EXTREMITIES: no hand tremors.  NEURO: Cranial nerves grossly intact, mentation intact and speech normal  SKIN: No apparent skin lesions, rash or edema seen   PSYCH: mentation appears normal, affect normal/bright, judgement and insight intact, normal speech and appearance well-groomed      LABS:  A1c:  Lab Results   Component Value Date    A1C 8.1 10/27/2023    A1C 7.2 07/11/2022    A1C 7.3 06/28/2022    A1C 7.0 12/14/2021    A1C 6.0 09/07/2021    A1C 8.6 01/18/2021    A1C 7.8 11/04/2020    A1C 7.2 02/08/2020       Creatinine:  Creatinine   Date Value Ref Range Status   10/27/2023 0.62 (L) 0.67 - 1.17 mg/dL Final   01/18/2021 0.56 0.50 - 1.00 mg/dL Final   ]    Urine Micro:  Lab Results   Component Value Date    MICROL 6 09/07/2021     No results found for: \"MICROALBUMIN\"      LFTs/Lipids:  Recent Labs   Lab Test 10/27/23  1022 07/11/22  1432   CHOL 164 172*   HDL 34* 34*   LDL 64 60   TRIG 331* 390*     TFTs:  TSH   Date Value Ref Range Status   10/27/2023 1.84 0.30 - 4.20 uIU/mL Final   07/11/2022 2.05 0.40 - 4.00 mU/L Final   01/18/2021 2.61 0.40 - 4.00 mU/L Final     All pertinent notes, labs, and images personally " reviewed by me.     Glucometer/ insulin pump (if applicable)/ CGM data (if applicable) downloaded, Personally reviewed and interpreted.  All Blood sugar data reviewed personally and discussed with pt.  See nursing note from 11/6/2023 for details of BG/CGM log.      A/P  Mr.Alexander RADHA James is a 18 year old here for the evaluation/management of diabetes:    1. DM2 - Under fair control.  A1c 8.1%  No known DM related complications.  FBG mostly high.  Limited data available.  Plan:  Discussed diagnosis, pathophysiology, management and treatment options of condition with pt.  I also discussed importance of strict blood sugar control to prevent complications associated with uncontrolled diabetes.  Continue current diabetes regimen-metformin XR 2000 mg/day and Ozempic 2 mg/week  Restart Ozempic as soon as possible  Consider 15-day diagnostic sensor.  Please inform us if you need diabetic educator referral  Follow up with Sharon ARCEO in 3 months with labs prior.   Repeat labs and follow up with  in 6 months.  Please make a lab appointment for blood work and follow up clinic appointment in 1 week after that to discuss results.     2. Hypertension - Under Good control.  Not on medication    3. Hyperlipidemia - Under Good control.  + High triglyceride levels.  Not on medication    4. Prevention:  Opthalmology-recommend yearly  ASA-not indicated secondary to age  Smoking-no    Most Recent Immunizations   Administered Date(s) Administered    COVID-19 Bivalent 12+ (Pfizer) 10/15/2022    COVID-19 MONOVALENT 12+ (Pfizer) 11/26/2021    DTAP (<7y) 08/14/2008    FLU 6-35 months 11/30/2009    HEPA 09/14/2015    HEPATITIS A (PEDS 12M-18Y) 08/28/2014    HIB (PRP-T) 08/12/2004    HIB(PRP-OMP)(PedvaxHIB) 08/12/2004    HPV9 10/23/2020    HepB 05/19/2004    Hepatitis A (ADULT 19+) 09/14/2015    Hepatitis B, Peds 05/19/2004    Influenza (H1N1) 11/30/2009    Influenza (IIV3) PF 01/17/2013    Influenza Vaccine >6 months  (Alfuria,Fluzone) 10/15/2022    MMR 08/14/2008    Meningococcal ACWY (Menactra ) 08/29/2019    Meningococcal ACWY (Menveo ) 08/28/2014    Meningococcal B (Bexsero ) 10/23/2020    Pneumococcal (PCV 7) 02/22/2005    Poliovirus, inactivated (IPV) 08/14/2008    TDAP Vaccine (Boostrix) 08/26/2013    Varicella 08/14/2008         Recommend checking blood sugars before meals and at bedtime.    If Blood glucose are low more often-> 2-3 times/week- give us a call.  The patient is advised to Make better food choices: reduce carbs, Reduce portion size, weight loss and exercise 3-4 times a week.  Discussed hypoglycemia signs and symptoms as well as management in detail.    There is some variability among people, most will usually develop symptoms suggestive of hypoglycemia when blood glucose levels are lowered to the mid 60's. The first set of symptoms are called adrenergic. Patients may experience any of the following nervousness, sweating, intense hunger, trembling, weakness, palpitations, and difficulty speaking.   The acute management of hypoglycemia involves the rapid delivery of a source of easily absorbed sugar. Regular soda, juice, lifesavers, table sugar, are good options. 15 grams of glucose is the dose that is given, followed by an assessment of symptoms and a blood glucose check if possible. If after 10 minutes there is no improvement, another 10-15 grams should be given. This can be repeated up to three times. The equivalency of 10-15 grams of glucose (approximate servings) are: Four lifesavers, 4 teaspoons of sugar, or 1/2 can of regular soda or juice.     Labs ordered today: No orders of the defined types were placed in this encounter.      Radiology/Consults ordered today: None    Meds ordered today: No orders of the defined types were placed in this encounter.      There are no discontinued medications.  All questions were answered.  The patient indicates understanding of the above issues and agrees with the  plan set forth.     Follow-up:  As noted in AVS    Yoli Johnson M.D  Endocrinology  [facility]  CC: No Ref-Primary, Physician    Disclaimer: This note consists of symbols derived from keyboarding, dictation and/or voice recognition software. As a result, there may be errors in the script that have gone undetected. Please consider this when interpreting information found in this chart.    Addendum to above note and clinic visit:    Labs reviewed.    See result note/telephone encounter.

## 2024-01-22 ENCOUNTER — TELEPHONE (OUTPATIENT)
Dept: ENDOCRINOLOGY | Facility: CLINIC | Age: 19
End: 2024-01-22
Payer: COMMERCIAL

## 2024-01-22 NOTE — TELEPHONE ENCOUNTER
Prior Authorization Retail Medication Request    Medication/Dose: Ozempic 2 mg  Diagnosis and ICD code (if different than what is on RX):  E119  New/renewal/insurance change PA/secondary ins. PA:  Previously Tried and Failed:    Rationale:      Insurance   Primary:  Commercial  Insurance ID:  [id number]

## 2024-01-26 NOTE — TELEPHONE ENCOUNTER
Central Prior Authorization Team - Phone: [phone]     PA Initiation    Medication: OZEMPIC (2 MG/DOSE) 8 MG/3ML SC SOPN  Insurance Company: [employer] - Phone [phone] Fax [phone]  Pharmacy Filling the Rx: [facility] - [address] [facility]   Filling Pharmacy Phone: [phone]  Filling Pharmacy Fax:    Start Date: 1/26/2024

## 2024-01-29 NOTE — TELEPHONE ENCOUNTER
Prior Authorization Approval    Authorization Effective Date: 12/27/2023  Authorization Expiration Date: 1/25/2025  Medication: Ozempic 2 mg- PA INITIATED  Reference #:     Insurance Company: [employer] - Phone [phone] Fax [phone]  Which Pharmacy is filling the prescription (Not needed for infusion/clinic administered): [facility] - [address] [facility]   Pharmacy Notified: Yes  Patient Notified: Instructed pharmacy to notify patient when script is ready to /ship.

## 2024-03-12 ENCOUNTER — OFFICE VISIT (OUTPATIENT)
Dept: INTERNAL MEDICINE | Facility: CLINIC | Age: 19
End: 2024-03-12
Payer: COMMERCIAL

## 2024-03-12 VITALS
HEIGHT: 73 IN | BODY MASS INDEX: 34.27 KG/M2 | DIASTOLIC BLOOD PRESSURE: 89 MMHG | SYSTOLIC BLOOD PRESSURE: 136 MMHG | OXYGEN SATURATION: 95 % | TEMPERATURE: 97.9 F | HEART RATE: 124 BPM | WEIGHT: 258.6 LBS | RESPIRATION RATE: 22 BRPM

## 2024-03-12 DIAGNOSIS — H66.006 RECURRENT ACUTE SUPPURATIVE OTITIS MEDIA WITHOUT SPONTANEOUS RUPTURE OF TYMPANIC MEMBRANE OF BOTH SIDES: Primary | ICD-10-CM

## 2024-03-12 PROCEDURE — 99213 OFFICE O/P EST LOW 20 MIN: CPT

## 2024-03-12 NOTE — PROGRESS NOTES
"  Assessment & Plan     (H66.006) Recurrent acute suppurative otitis media without spontaneous rupture of tympanic membrane of both sides  (primary encounter diagnosis)  Comment: Patient presents to the clinic for a upper respiratory illness that is lasted for 5 days.  Patient endorses sore throat, headache, fever, cough.  Upon exam patient was found to have bilateral acute otitis media.  Given his history we will start the patient on antibiotic.  Clinical picture suggest secondary infection due to potentially influenza, COVID or RSV.  Patient has declined respiratory panel at this time.  Plan: amoxicillin-clavulanate (AUGMENTIN) 875-125 MG         tablet        Medication sent to pharmacy.  Discussed side effects of the medication and to take the medication twice daily for 10 days and its full course with food.  Discussed that if his symptoms do not improve or worsen that he should return to the clinic for further evaluation.      20 minutes spent by me on the date of the encounter doing chart review, review of test results, interpretation of tests, patient visit, and documentation       BMI  Estimated body mass index is 34.12 kg/m  as calculated from the following:    Height as of this encounter: 1.854 m (6' 1\").    Weight as of this encounter: 117.3 kg (258 lb 9.6 oz).   Weight management plan: Patient was referred to their PCP to discuss a diet and exercise plan.          Nicole Ramesh is a 18 year old, presenting for the following health issues: Patient had a fever starting Friday. Over 102. Doing tylenol and nyquil and benadryl. Nyquil did tend to help. Fever was breaking with tylenol and ibuprofen.   Patient states he does have chronic issues with ear infections.   Denies Nausea/vomiting  States he did have some diarrhea yesterday.   Able to drink just fine but has been low appetite.   Has a headache, cough and sore throat. Sore throat is getting better.     Consult (Ha, cough, sore throat, fatigue " x's 5 days)      3/12/2024    12:49 PM   Additional Questions   Roomed by kari   Accompanied by mom         3/12/2024    12:49 PM   Patient Reported Additional Medications   Patient reports taking the following new medications none     HPI               Review of Systems  Constitutional, HEENT, cardiovascular, pulmonary, gi and gu systems are negative, except as otherwise noted.      Objective    There were no vitals taken for this visit.  There is no height or weight on file to calculate BMI.  Physical Exam   GENERAL: alert and no distress  EYES: Eyes grossly normal to inspection, PERRL and conjunctivae and sclerae normal  HENT: normal cephalic/atraumatic, both ears: erythematous and bulging membrane, nose and mouth without ulcers or lesions, oropharynx clear, and oral mucous membranes moist  NECK: no adenopathy, no asymmetry, masses, or scars  RESP: lungs clear to auscultation - no rales, rhonchi or wheezes  CV: regular rate and rhythm, normal S1 S2, no S3 or S4, no murmur, click or rub, no peripheral edema  ABDOMEN: soft, nontender, no hepatosplenomegaly, no masses and bowel sounds normal  MS: no gross musculoskeletal defects noted, no edema            Signed Electronically by: DENVER Fermin CNP

## 2024-05-03 ENCOUNTER — LAB (OUTPATIENT)
Dept: LAB | Facility: CLINIC | Age: 19
End: 2024-05-03
Payer: COMMERCIAL

## 2024-05-03 DIAGNOSIS — E11.8 TYPE 2 DIABETES MELLITUS WITH COMPLICATION, WITHOUT LONG-TERM CURRENT USE OF INSULIN (H): ICD-10-CM

## 2024-05-03 LAB — HBA1C MFR BLD: 8.2 % (ref 0–5.6)

## 2024-05-03 PROCEDURE — 82565 ASSAY OF CREATININE: CPT

## 2024-05-03 PROCEDURE — 82043 UR ALBUMIN QUANTITATIVE: CPT

## 2024-05-03 PROCEDURE — 83036 HEMOGLOBIN GLYCOSYLATED A1C: CPT

## 2024-05-03 PROCEDURE — 36415 COLL VENOUS BLD VENIPUNCTURE: CPT

## 2024-05-03 PROCEDURE — 82570 ASSAY OF URINE CREATININE: CPT

## 2024-05-03 NOTE — LETTER
May 7, 2024      Gadiel James  [address] [place] [address]        Dear Jacob,    We are writing to inform you of your test results.    Labs/ Imaging studies done with endocrinology are attached.     Follow-up in endocrinology Clinic as scheduled  5/13/24    Resulted Orders   Hemoglobin A1c   Result Value Ref Range    Hemoglobin A1C 8.2 (H) 0.0 - 5.6 %      Comment:      Normal <5.7%   Prediabetes 5.7-6.4%    Diabetes 6.5% or higher     Note: Adopted from ADA consensus guidelines.    Narrative    Results consistent with previous, repeat testing unnecessary     Albumin Random Urine Quantitative with Creat Ratio   Result Value Ref Range    Creatinine Urine mg/dL 75.9 mg/dL      Comment:      The reference ranges have not been established in urine creatinine. The results should be integrated into the clinical context for interpretation.    Albumin Urine mg/L <12.0 mg/L      Comment:      The reference ranges have not been established in urine albumin. The results should be integrated into the clinical context for interpretation.    Albumin Urine mg/g Cr        Comment:      Unable to calculate, urine albumin and/or urine creatinine is outside detectable limits.  Microalbuminuria is defined as an albumin:creatinine ratio of 17 to 299 for males and 25 to 299 for females. A ratio of albumin:creatinine of 300 or higher is indicative of overt proteinuria.  Due to biologic variability, positive results should be confirmed by a second, first-morning random or 24-hour timed urine specimen. If there is discrepancy, a third specimen is recommended. When 2 out of 3 results are in the microalbuminuria range, this is evidence for incipient nephropathy and warrants increased efforts at glucose control, blood pressure control, and institution of therapy with an angiotensin-converting-enzyme (ACE) inhibitor (if the patient can tolerate it).     Creatinine   Result Value Ref Range    Creatinine 0.63 (L) 0.67  - 1.17 mg/dL    GFR Estimate >90 >60 mL/min/1.73m2       If you have any questions or concerns, please call the clinic at the number listed above.       Sincerely,      Yoli Johnson MD

## 2024-05-04 LAB
CREAT SERPL-MCNC: 0.63 MG/DL (ref 0.67–1.17)
CREAT UR-MCNC: 75.9 MG/DL
EGFRCR SERPLBLD CKD-EPI 2021: >90 ML/MIN/1.73M2
MICROALBUMIN UR-MCNC: <12 MG/L
MICROALBUMIN/CREAT UR: NORMAL MG/G{CREAT}

## 2024-05-06 NOTE — RESULT ENCOUNTER NOTE
Gadiel    Labs/ Imaging studies done with endocrinology are attached.    Follow-up in endocrinology Clinic as scheduled  5/13/24    Please call endocrinology clinic if questions.    Please send a letter with above lab/test results and above comments.    Thank you.    Yoli Johnson MD

## 2024-05-14 DIAGNOSIS — E11.9 TYPE 2 DIABETES MELLITUS WITHOUT COMPLICATION, WITHOUT LONG-TERM CURRENT USE OF INSULIN (H): ICD-10-CM

## 2024-05-14 DIAGNOSIS — E66.01 SEVERE OBESITY DUE TO EXCESS CALORIES WITHOUT SERIOUS COMORBIDITY WITH BODY MASS INDEX (BMI) GREATER THAN 99TH PERCENTILE FOR AGE IN PEDIATRIC PATIENT (H): ICD-10-CM

## 2024-05-14 RX ORDER — SEMAGLUTIDE 2.68 MG/ML
2 INJECTION, SOLUTION SUBCUTANEOUS
Qty: 3 ML | Refills: 6 | OUTPATIENT
Start: 2024-05-14

## 2024-05-14 NOTE — TELEPHONE ENCOUNTER
Patient will be establishing with new PCP and ask them to manage his Diabetes in the future.    Requested Prescriptions   Pending Prescriptions Disp Refills    Semaglutide (2 MG/DOSE) (OZEMPIC (2 MG/DOSE)) 8 MG/3ML pen  Last Written Prescription Date:  11/06/23  Last Fill Quantity: 3 mL,  # refills: 6   Last office visit: Visit date not found ; last virtual visit: 11/6/2023 with prescribing provider:  VV 11/06/23   Future Office Visit:   Next 5 appointments (look out 90 days)      May 22, 2024  5:00 PM  (Arrive by 4:40 PM)  Provider Visit with Kvng Mckeon MD  [facility] ([facility] [address] [facility]  [address] [place] [id number] [phone]                  3 mL 6     Sig: Inject 2 mg Subcutaneous every 7 days       There is no refill protocol information for this order

## 2024-05-15 NOTE — TELEPHONE ENCOUNTER
Patient's mom reports patient is out of medication refills for Ozempic. Each prescription lasts them 4 weeks. He will need one more refill to provide medication until his next appointment.    Daly Kwon RN  [facility]

## 2024-05-16 RX ORDER — SEMAGLUTIDE 2.68 MG/ML
2 INJECTION, SOLUTION SUBCUTANEOUS
Qty: 3 ML | Refills: 0 | Status: SHIPPED | OUTPATIENT
Start: 2024-05-16 | End: 2024-06-03

## 2024-05-16 NOTE — TELEPHONE ENCOUNTER
I called LM informing that the pharmacy should have one additional refill from the RX sent in Nov.

## 2024-05-16 NOTE — TELEPHONE ENCOUNTER
Patient's mother calling stating pharmacy told her that clinic should call pharmacy if there's questions. Pharmacy told patient's mother there's no refills left. Please call pharmacy to discuss.

## 2024-05-22 ENCOUNTER — OFFICE VISIT (OUTPATIENT)
Dept: PEDIATRICS | Facility: CLINIC | Age: 19
End: 2024-05-22
Payer: COMMERCIAL

## 2024-05-22 VITALS
SYSTOLIC BLOOD PRESSURE: 131 MMHG | TEMPERATURE: 97.9 F | HEART RATE: 121 BPM | OXYGEN SATURATION: 97 % | HEIGHT: 73 IN | DIASTOLIC BLOOD PRESSURE: 84 MMHG | WEIGHT: 266.3 LBS | BODY MASS INDEX: 35.29 KG/M2 | RESPIRATION RATE: 16 BRPM

## 2024-05-22 DIAGNOSIS — F84.0 AUTISM: ICD-10-CM

## 2024-05-22 DIAGNOSIS — F32.1 MAJOR DEPRESSIVE DISORDER, SINGLE EPISODE, MODERATE (H): ICD-10-CM

## 2024-05-22 DIAGNOSIS — D22.9 SUSPICIOUS NEVUS: Primary | ICD-10-CM

## 2024-05-22 DIAGNOSIS — E11.8 TYPE 2 DIABETES MELLITUS WITH COMPLICATION, WITHOUT LONG-TERM CURRENT USE OF INSULIN (H): ICD-10-CM

## 2024-05-22 PROCEDURE — 99214 OFFICE O/P EST MOD 30 MIN: CPT | Performed by: INTERNAL MEDICINE

## 2024-05-22 ASSESSMENT — PATIENT HEALTH QUESTIONNAIRE - PHQ9
SUM OF ALL RESPONSES TO PHQ QUESTIONS 1-9: 11
10. IF YOU CHECKED OFF ANY PROBLEMS, HOW DIFFICULT HAVE THESE PROBLEMS MADE IT FOR YOU TO DO YOUR WORK, TAKE CARE OF THINGS AT HOME, OR GET ALONG WITH OTHER PEOPLE: EXTREMELY DIFFICULT
SUM OF ALL RESPONSES TO PHQ QUESTIONS 1-9: 11

## 2024-05-22 ASSESSMENT — PAIN SCALES - GENERAL: PAINLEVEL: NO PAIN (0)

## 2024-05-22 NOTE — PROGRESS NOTES
Assessment & Plan       ICD-10-CM    1. Suspicious nevus  D22.9 Adult Dermatology  Referral      2. Autism  F84.0       3. Type 2 diabetes mellitus with complication, without long-term current use of insulin (H)  E11.8       4. Major depressive disorder, single episode, moderate (H)  F32.1         Kavin is new to my practice.  We reviewed his health history today.    Recommend dermatology referral due to the nevus on his scalp which may be changing.    Continue with specialty follow-up for diabetes and his mood issues.    Kvng Mckeon MD      Nicole Ramesh is a 18 year old, presenting for the following health issues:  Establish Care      5/22/2024     4:43 PM   Additional Questions   Roomed by IN         5/22/2024     4:43 PM   Patient Reported Additional Medications   Patient reports taking the following new medications None     History of Present Illness       Reason for visit:  $    He eats 4 or more servings of fruits and vegetables daily.He consumes 2 sweetened beverage(s) daily.He exercises with enough effort to increase his heart rate 10 to 19 minutes per day.  He exercises with enough effort to increase his heart rate 3 or less days per week.   He is taking medications regularly.       Kavin is here with his father to establish care.  His previous primary care provider is a pediatrician therefore he has aged out of that practice.    We reviewed his health history.    He has autism.  He is working with the transitions program.  Not currently working but is considering that in the near future.    Type 2 diabetes.  This is managed by endocrinology.    Depression and ADHD.  This is managed by psychiatry.    He has a skin lesion to review.  On the left side of his scalp.  Is becoming more bothersome over time.          Objective    /84 (BP Location: Right arm, Patient Position: Sitting, Cuff Size: Adult Large)   Pulse (!) 121   Temp 97.9  F (36.6  C) (Temporal)   Resp 16   Ht 1.854 m  "(6' 1\")   Wt 120.8 kg (266 lb 4.8 oz)   SpO2 97%   BMI 35.13 kg/m    Body mass index is 35.13 kg/m .  Physical Exam   GEN: No distress  HEENT: PERRL. EOMI. TM's clear bilaterally. Nasal mucosa normal. OP moist without lesions.   NECK: Supple  LUNGS: CTA david  CV: Tachycardic but regular  SKIN: Raised mostly tan nevus on the left parietal scalp.  Slightly irregular base.  Brown round nodule noted on the left chin as well.            Signed Electronically by: Kvng Mckeon MD    "

## 2024-05-25 PROBLEM — D75.1 POLYCYTHEMIA: Status: RESOLVED | Noted: 2022-11-03 | Resolved: 2024-05-25

## 2024-06-03 ENCOUNTER — TELEPHONE (OUTPATIENT)
Dept: PEDIATRICS | Facility: CLINIC | Age: 19
End: 2024-06-03
Payer: COMMERCIAL

## 2024-06-03 DIAGNOSIS — E66.01 SEVERE OBESITY DUE TO EXCESS CALORIES WITHOUT SERIOUS COMORBIDITY WITH BODY MASS INDEX (BMI) GREATER THAN 99TH PERCENTILE FOR AGE IN PEDIATRIC PATIENT (H): ICD-10-CM

## 2024-06-03 DIAGNOSIS — E11.9 TYPE 2 DIABETES MELLITUS WITHOUT COMPLICATION, WITHOUT LONG-TERM CURRENT USE OF INSULIN (H): ICD-10-CM

## 2024-06-03 RX ORDER — SEMAGLUTIDE 2.68 MG/ML
2 INJECTION, SOLUTION SUBCUTANEOUS
Qty: 9 ML | Refills: 1 | Status: SHIPPED | OUTPATIENT
Start: 2024-06-03

## 2024-06-03 RX ORDER — METFORMIN HCL 500 MG
2000 TABLET, EXTENDED RELEASE 24 HR ORAL
Qty: 360 TABLET | Refills: 1 | Status: SHIPPED | OUTPATIENT
Start: 2024-06-03

## 2024-06-03 NOTE — TELEPHONE ENCOUNTER
Patient mother calling wondering if Dr. Mckeon can take over prescribing metformin and ozempic as it is so hard to get in with endocrinology. They cannot get in with endo until December and he is needing refills on this now. They will not provide refills until seen.  Maricarmen Adorno,

## 2024-06-03 NOTE — TELEPHONE ENCOUNTER
Please call:  I can take over his diabetes prescriptions. He should schedule a visit with me this fall for a DM check as well as fasting labwork.    Rx for metformin and semaglutide were sent to his pharmacy.

## 2024-07-30 ENCOUNTER — PATIENT OUTREACH (OUTPATIENT)
Dept: CARE COORDINATION | Facility: CLINIC | Age: 19
End: 2024-07-30
Payer: COMMERCIAL

## 2024-10-28 ENCOUNTER — TELEPHONE (OUTPATIENT)
Dept: PEDIATRICS | Facility: CLINIC | Age: 19
End: 2024-10-28
Payer: COMMERCIAL

## 2024-10-28 NOTE — TELEPHONE ENCOUNTER
M for pt to call [phone] to reschedule the Dr Mckeon appt on 11/1. This can be rescheduled to the [facility] on 11/1 or to Dr Mckeon this week (10/28-10/30)

## 2024-12-19 DIAGNOSIS — F91.9 DISRUPTIVE BEHAVIOR DISORDER: ICD-10-CM

## 2024-12-19 DIAGNOSIS — F84.0 AUTISTIC DISORDER, RESIDUAL STATE: ICD-10-CM

## 2024-12-19 DIAGNOSIS — F90.9 ATTENTION DEFICIT HYPERACTIVITY DISORDER: Primary | ICD-10-CM

## 2024-12-23 ENCOUNTER — TELEPHONE (OUTPATIENT)
Dept: PEDIATRICS | Facility: CLINIC | Age: 19
End: 2024-12-23

## 2024-12-23 ENCOUNTER — LAB (OUTPATIENT)
Dept: LAB | Facility: CLINIC | Age: 19
End: 2024-12-23
Payer: COMMERCIAL

## 2024-12-23 DIAGNOSIS — F91.9 DISRUPTIVE BEHAVIOR DISORDER: ICD-10-CM

## 2024-12-23 DIAGNOSIS — F84.0 AUTISTIC DISORDER, RESIDUAL STATE: ICD-10-CM

## 2024-12-23 DIAGNOSIS — F90.9 ATTENTION DEFICIT HYPERACTIVITY DISORDER: ICD-10-CM

## 2024-12-23 DIAGNOSIS — E11.9 TYPE 2 DIABETES MELLITUS WITHOUT COMPLICATION, WITHOUT LONG-TERM CURRENT USE OF INSULIN (H): ICD-10-CM

## 2024-12-23 LAB
ALBUMIN SERPL BCG-MCNC: 4.5 G/DL (ref 3.5–5.2)
ALP SERPL-CCNC: 95 U/L (ref 40–150)
ALT SERPL W P-5'-P-CCNC: 85 U/L (ref 0–70)
ANION GAP SERPL CALCULATED.3IONS-SCNC: 13 MMOL/L (ref 7–15)
AST SERPL W P-5'-P-CCNC: 48 U/L (ref 0–45)
BASOPHILS # BLD AUTO: 0 10E3/UL (ref 0–0.2)
BASOPHILS NFR BLD AUTO: 0 %
BILIRUB DIRECT SERPL-MCNC: <0.2 MG/DL (ref 0–0.3)
BILIRUB SERPL-MCNC: 0.5 MG/DL
BUN SERPL-MCNC: 8.5 MG/DL (ref 6–20)
CALCIUM SERPL-MCNC: 9.5 MG/DL (ref 8.8–10.4)
CHLORIDE SERPL-SCNC: 101 MMOL/L (ref 98–107)
CHOLEST SERPL-MCNC: 190 MG/DL
CREAT SERPL-MCNC: 0.56 MG/DL (ref 0.67–1.17)
EGFRCR SERPLBLD CKD-EPI 2021: >90 ML/MIN/1.73M2
EOSINOPHIL # BLD AUTO: 0.4 10E3/UL (ref 0–0.7)
EOSINOPHIL NFR BLD AUTO: 6 %
ERYTHROCYTE [DISTWIDTH] IN BLOOD BY AUTOMATED COUNT: 11.9 % (ref 10–15)
EST. AVERAGE GLUCOSE BLD GHB EST-MCNC: 217 MG/DL
FASTING STATUS PATIENT QL REPORTED: YES
FASTING STATUS PATIENT QL REPORTED: YES
FERRITIN SERPL-MCNC: 445 NG/ML (ref 31–409)
GLUCOSE SERPL-MCNC: 224 MG/DL (ref 70–99)
HBA1C MFR BLD: 9.2 % (ref 0–5.6)
HCO3 SERPL-SCNC: 23 MMOL/L (ref 22–29)
HCT VFR BLD AUTO: 47 % (ref 40–53)
HDLC SERPL-MCNC: 31 MG/DL
HGB BLD-MCNC: 16.2 G/DL (ref 13.3–17.7)
IMM GRANULOCYTES # BLD: 0 10E3/UL
IMM GRANULOCYTES NFR BLD: 0 %
LDLC SERPL CALC-MCNC: ABNORMAL MG/DL
LDLC SERPL DIRECT ASSAY-MCNC: 86 MG/DL
LYMPHOCYTES # BLD AUTO: 3 10E3/UL (ref 0.8–5.3)
LYMPHOCYTES NFR BLD AUTO: 45 %
MCH RBC QN AUTO: 30.1 PG (ref 26.5–33)
MCHC RBC AUTO-ENTMCNC: 34.5 G/DL (ref 31.5–36.5)
MCV RBC AUTO: 87 FL (ref 78–100)
MONOCYTES # BLD AUTO: 0.5 10E3/UL (ref 0–1.3)
MONOCYTES NFR BLD AUTO: 7 %
NEUTROPHILS # BLD AUTO: 2.8 10E3/UL (ref 1.6–8.3)
NEUTROPHILS NFR BLD AUTO: 41 %
NONHDLC SERPL-MCNC: 159 MG/DL
PLATELET # BLD AUTO: 244 10E3/UL (ref 150–450)
POTASSIUM SERPL-SCNC: 4.2 MMOL/L (ref 3.4–5.3)
PROT SERPL-MCNC: 7.3 G/DL (ref 6.4–8.3)
RBC # BLD AUTO: 5.39 10E6/UL (ref 4.4–5.9)
SODIUM SERPL-SCNC: 137 MMOL/L (ref 135–145)
T4 FREE SERPL-MCNC: 1.11 NG/DL (ref 0.9–1.7)
TRIGL SERPL-MCNC: 791 MG/DL
TSH SERPL DL<=0.005 MIU/L-ACNC: 2.77 UIU/ML (ref 0.3–4.2)
VIT D+METAB SERPL-MCNC: 21 NG/ML (ref 20–50)
WBC # BLD AUTO: 6.7 10E3/UL (ref 4–11)

## 2024-12-23 PROCEDURE — 83036 HEMOGLOBIN GLYCOSYLATED A1C: CPT

## 2024-12-23 PROCEDURE — 82306 VITAMIN D 25 HYDROXY: CPT

## 2024-12-23 PROCEDURE — 83721 ASSAY OF BLOOD LIPOPROTEIN: CPT

## 2024-12-23 PROCEDURE — 36415 COLL VENOUS BLD VENIPUNCTURE: CPT

## 2024-12-23 PROCEDURE — 82248 BILIRUBIN DIRECT: CPT

## 2024-12-23 PROCEDURE — 84443 ASSAY THYROID STIM HORMONE: CPT

## 2024-12-23 PROCEDURE — 82728 ASSAY OF FERRITIN: CPT

## 2024-12-23 PROCEDURE — 80053 COMPREHEN METABOLIC PANEL: CPT

## 2024-12-23 PROCEDURE — 85025 COMPLETE CBC W/AUTO DIFF WBC: CPT

## 2024-12-23 PROCEDURE — 80061 LIPID PANEL: CPT | Mod: 59

## 2024-12-23 PROCEDURE — 84439 ASSAY OF FREE THYROXINE: CPT

## 2024-12-23 NOTE — TELEPHONE ENCOUNTER
Mom calling to check on the recent lab work(ok per ctc).  Advised that the provider has not yet reviewed the lab work.  Mom was concerned that A1C and glucose readings are going to be elevated.  Advised that the provider will send a message with recommendations once labs are reviewed.  Mom verbalized understanding.

## 2025-03-10 ENCOUNTER — VIRTUAL VISIT (OUTPATIENT)
Dept: PEDIATRICS | Facility: CLINIC | Age: 20
End: 2025-03-10
Payer: COMMERCIAL

## 2025-03-10 DIAGNOSIS — E78.1 HYPERTRIGLYCERIDEMIA: ICD-10-CM

## 2025-03-10 DIAGNOSIS — E11.65 TYPE 2 DIABETES MELLITUS WITH HYPERGLYCEMIA, WITHOUT LONG-TERM CURRENT USE OF INSULIN (H): Primary | ICD-10-CM

## 2025-03-10 DIAGNOSIS — F84.0 AUTISM: ICD-10-CM

## 2025-03-10 DIAGNOSIS — F32.1 MAJOR DEPRESSIVE DISORDER, SINGLE EPISODE, MODERATE (H): ICD-10-CM

## 2025-03-10 PROCEDURE — 98006 SYNCH AUDIO-VIDEO EST MOD 30: CPT | Performed by: INTERNAL MEDICINE

## 2025-03-10 RX ORDER — GLIPIZIDE 10 MG/1
10 TABLET, FILM COATED, EXTENDED RELEASE ORAL DAILY
Qty: 90 TABLET | Refills: 3 | Status: SHIPPED | OUTPATIENT
Start: 2025-03-10

## 2025-03-10 NOTE — PROGRESS NOTES
"Gadiel is a 19 year old who is being evaluated via a billable video visit.        Assessment & Plan       ICD-10-CM    1. Type 2 diabetes mellitus with hyperglycemia, without long-term current use of insulin (H)  E11.65       2. Hypertriglyceridemia  E78.1       3. Autism  F84.0       4. Major depressive disorder, single episode, moderate (H)  F32.1         Type 2 diabetes, not currently controlled. Likely related to diet. Management options discussed.  Recommend:  Continue Ozempic 2 mg once weekly.    Add Glipizide 10 mg once per day.    Decrease Metformin to 3 pills (1,500 mg) daily   See if this improves loose stools    Repeat fasting labwork in 2-3 months    Follow-up with me in clinic in about 3 months          BMI  Estimated body mass index is 35.13 kg/m  as calculated from the following:    Height as of 5/22/24: 1.854 m (6' 1\").    Weight as of 5/22/24: 120.8 kg (266 lb 4.8 oz).             Subjective   Gadiel is a 19 year old, presenting for the following health issues:  Diabetes    HPI      Visit for type 2 diabetes. Labwork was completed at the end of December.  Lab Results   Component Value Date    A1C 9.2 12/23/2024    A1C 8.2 05/03/2024    A1C 7.3 06/28/2022    A1C 7.0 12/14/2021     Is taking medications as directed.  Does not follow DM diet.   Current prescriptions: Ozempic 2 mg weekly, Metformin 2,000 mg daily  Is having loose stools several times per week. Discussed possible relationship w/ metformin.  We reviewed medication options to help with glycemic control.  Is willing to add a 3rd medication.    Triglyceride level is high. Discussed relationship with elevated TG and high glucose levels.  LDL is 86. No clear need for a statin at this time.    Autism. His mother has helped with this visit as well as with his overall medical care.    Depression. Managed by psychiatry.         Objective           Vitals:  No vitals were obtained today due to virtual visit.    Physical Exam   GEN: No " distress          Video-Visit Details    Type of service:  Video Visit   Originating Location (pt. Location): Home    Distant Location (provider location):  On-site  Platform used for Video Visit: Ray  Signed Electronically by: Kvng Mckeon MD

## 2025-03-10 NOTE — PATIENT INSTRUCTIONS
Continue Ozempic 2 mg once weekly.    Add Glipizide 10 mg once per day.    Decrease Metformin to 3 pills (1,500 mg) daily   See if this improves loose stools    Repeat fasting labwork in 2-3 months    Follow-up with me in clinic in about 3 months

## 2025-04-07 ENCOUNTER — TELEPHONE (OUTPATIENT)
Dept: PHARMACY | Facility: CLINIC | Age: 20
End: 2025-04-07
Payer: COMMERCIAL

## 2025-04-07 NOTE — TELEPHONE ENCOUNTER
[facility] Recruitment: [facility]     Referral outreach attempt #1 on April 7, 2025      Outcome: left voicemail- Call back number [phone]    Rica Pan [facility]  -[facility]  [phone]

## 2025-04-14 ENCOUNTER — TELEPHONE (OUTPATIENT)
Dept: PHARMACY | Facility: CLINIC | Age: 20
End: 2025-04-14
Payer: COMMERCIAL

## 2025-04-14 NOTE — TELEPHONE ENCOUNTER
[facility] Recruitment: [facility]     Referral outreach attempt #2 on April 14, 2025      Outcome: left voicemail- Call back number [phone]    Rica Pan [facility]  -[facility]  [phone]

## 2025-05-12 DIAGNOSIS — E11.9 TYPE 2 DIABETES MELLITUS WITHOUT COMPLICATION, WITHOUT LONG-TERM CURRENT USE OF INSULIN (H): ICD-10-CM

## 2025-05-12 DIAGNOSIS — E66.01 SEVERE OBESITY DUE TO EXCESS CALORIES WITHOUT SERIOUS COMORBIDITY WITH BODY MASS INDEX (BMI) GREATER THAN 99TH PERCENTILE FOR AGE IN PEDIATRIC PATIENT (H): ICD-10-CM

## 2025-05-12 RX ORDER — SEMAGLUTIDE 2.68 MG/ML
INJECTION, SOLUTION SUBCUTANEOUS
Qty: 9 ML | Refills: 0 | Status: SHIPPED | OUTPATIENT
Start: 2025-05-12

## 2025-05-27 ENCOUNTER — TELEPHONE (OUTPATIENT)
Dept: PEDIATRICS | Facility: CLINIC | Age: 20
End: 2025-05-27
Payer: COMMERCIAL

## 2025-05-27 NOTE — TELEPHONE ENCOUNTER
Forms/Letter Request    Type of form/letter: OTHER: ICD-10 code form     Do we have the form/letter: Yes: Form is on the provider's desk for review    Who is the form from? [employer]     Where did/will the form come from? form was faxed in    How would you like the form/letter returned: Fax : [phone]

## 2025-06-11 ENCOUNTER — RESULTS FOLLOW-UP (OUTPATIENT)
Dept: PEDIATRICS | Facility: CLINIC | Age: 20
End: 2025-06-11

## 2025-06-11 ENCOUNTER — TELEPHONE (OUTPATIENT)
Dept: PEDIATRICS | Facility: CLINIC | Age: 20
End: 2025-06-11
Payer: COMMERCIAL

## 2025-06-11 DIAGNOSIS — E78.1 HYPERTRIGLYCERIDEMIA: ICD-10-CM

## 2025-06-11 DIAGNOSIS — E11.9 TYPE 2 DIABETES MELLITUS WITHOUT COMPLICATION, WITHOUT LONG-TERM CURRENT USE OF INSULIN (H): Primary | ICD-10-CM

## 2025-06-11 LAB
EST. AVERAGE GLUCOSE BLD GHB EST-MCNC: 194 MG/DL
HBA1C MFR BLD: 8.4 % (ref 0–5.6)

## 2025-06-11 PROCEDURE — 83721 ASSAY OF BLOOD LIPOPROTEIN: CPT | Mod: 59 | Performed by: INTERNAL MEDICINE

## 2025-06-11 PROCEDURE — 83036 HEMOGLOBIN GLYCOSYLATED A1C: CPT | Performed by: INTERNAL MEDICINE

## 2025-06-11 PROCEDURE — 36415 COLL VENOUS BLD VENIPUNCTURE: CPT | Performed by: INTERNAL MEDICINE

## 2025-06-11 PROCEDURE — 80061 LIPID PANEL: CPT | Performed by: INTERNAL MEDICINE

## 2025-06-12 LAB
CHOLEST SERPL-MCNC: 199 MG/DL
FASTING STATUS PATIENT QL REPORTED: YES
HDLC SERPL-MCNC: 34 MG/DL
LDLC SERPL CALC-MCNC: ABNORMAL MG/DL
LDLC SERPL DIRECT ASSAY-MCNC: 97 MG/DL
NONHDLC SERPL-MCNC: 165 MG/DL
TRIGL SERPL-MCNC: 728 MG/DL

## 2025-06-16 ENCOUNTER — OFFICE VISIT (OUTPATIENT)
Dept: PEDIATRICS | Facility: CLINIC | Age: 20
End: 2025-06-16
Payer: COMMERCIAL

## 2025-06-16 VITALS
HEIGHT: 73 IN | DIASTOLIC BLOOD PRESSURE: 83 MMHG | BODY MASS INDEX: 34.82 KG/M2 | SYSTOLIC BLOOD PRESSURE: 133 MMHG | OXYGEN SATURATION: 98 % | HEART RATE: 105 BPM | RESPIRATION RATE: 16 BRPM | WEIGHT: 262.7 LBS | TEMPERATURE: 98.6 F

## 2025-06-16 DIAGNOSIS — E78.1 HYPERTRIGLYCERIDEMIA: ICD-10-CM

## 2025-06-16 DIAGNOSIS — Z00.00 LABORATORY EXAMINATION ORDERED AS PART OF A ROUTINE GENERAL MEDICAL EXAMINATION: ICD-10-CM

## 2025-06-16 DIAGNOSIS — E11.65 TYPE 2 DIABETES MELLITUS WITH HYPERGLYCEMIA, WITHOUT LONG-TERM CURRENT USE OF INSULIN (H): Primary | ICD-10-CM

## 2025-06-16 DIAGNOSIS — E66.811 CLASS 1 OBESITY DUE TO EXCESS CALORIES WITH SERIOUS COMORBIDITY AND BODY MASS INDEX (BMI) OF 34.0 TO 34.9 IN ADULT: ICD-10-CM

## 2025-06-16 DIAGNOSIS — Z23 NEED FOR VACCINATION: ICD-10-CM

## 2025-06-16 DIAGNOSIS — E66.09 CLASS 1 OBESITY DUE TO EXCESS CALORIES WITH SERIOUS COMORBIDITY AND BODY MASS INDEX (BMI) OF 34.0 TO 34.9 IN ADULT: ICD-10-CM

## 2025-06-16 LAB
EST. AVERAGE GLUCOSE BLD GHB EST-MCNC: 192 MG/DL
HBA1C MFR BLD: 8.3 % (ref 0–5.6)
HOLD SPECIMEN: NORMAL

## 2025-06-16 PROCEDURE — 90472 IMMUNIZATION ADMIN EACH ADD: CPT | Performed by: INTERNAL MEDICINE

## 2025-06-16 PROCEDURE — 90715 TDAP VACCINE 7 YRS/> IM: CPT | Performed by: INTERNAL MEDICINE

## 2025-06-16 PROCEDURE — 3075F SYST BP GE 130 - 139MM HG: CPT | Performed by: INTERNAL MEDICINE

## 2025-06-16 PROCEDURE — 83036 HEMOGLOBIN GLYCOSYLATED A1C: CPT | Performed by: INTERNAL MEDICINE

## 2025-06-16 PROCEDURE — 3079F DIAST BP 80-89 MM HG: CPT | Performed by: INTERNAL MEDICINE

## 2025-06-16 PROCEDURE — 1126F AMNT PAIN NOTED NONE PRSNT: CPT | Performed by: INTERNAL MEDICINE

## 2025-06-16 PROCEDURE — 90471 IMMUNIZATION ADMIN: CPT | Performed by: INTERNAL MEDICINE

## 2025-06-16 PROCEDURE — 3052F HG A1C>EQUAL 8.0%<EQUAL 9.0%: CPT | Performed by: INTERNAL MEDICINE

## 2025-06-16 PROCEDURE — 82043 UR ALBUMIN QUANTITATIVE: CPT | Performed by: INTERNAL MEDICINE

## 2025-06-16 PROCEDURE — 99214 OFFICE O/P EST MOD 30 MIN: CPT | Mod: 25 | Performed by: INTERNAL MEDICINE

## 2025-06-16 PROCEDURE — 82570 ASSAY OF URINE CREATININE: CPT | Performed by: INTERNAL MEDICINE

## 2025-06-16 PROCEDURE — 36415 COLL VENOUS BLD VENIPUNCTURE: CPT | Performed by: INTERNAL MEDICINE

## 2025-06-16 PROCEDURE — 90651 9VHPV VACCINE 2/3 DOSE IM: CPT | Performed by: INTERNAL MEDICINE

## 2025-06-16 RX ORDER — METFORMIN HYDROCHLORIDE 500 MG/1
1500 TABLET, EXTENDED RELEASE ORAL
Qty: 270 TABLET | Refills: 1 | Status: SHIPPED | OUTPATIENT
Start: 2025-06-16

## 2025-06-16 RX ORDER — SEMAGLUTIDE 2.68 MG/ML
2 INJECTION, SOLUTION SUBCUTANEOUS
Qty: 9 ML | Refills: 1 | Status: SHIPPED | OUTPATIENT
Start: 2025-06-16

## 2025-06-16 RX ORDER — ATORVASTATIN CALCIUM 10 MG/1
10 TABLET, FILM COATED ORAL DAILY
Qty: 90 TABLET | Refills: 1 | Status: SHIPPED | OUTPATIENT
Start: 2025-06-16

## 2025-06-16 ASSESSMENT — PATIENT HEALTH QUESTIONNAIRE - PHQ9
SUM OF ALL RESPONSES TO PHQ QUESTIONS 1-9: 10
SUM OF ALL RESPONSES TO PHQ QUESTIONS 1-9: 10

## 2025-06-16 ASSESSMENT — PAIN SCALES - GENERAL: PAINLEVEL_OUTOF10: NO PAIN (0)

## 2025-06-16 NOTE — PATIENT INSTRUCTIONS
Continue with your current diabetes medications    Try to exercise daily    For your cholesterol, add Lipitor (atorvastatin) 10 mg once per day    Follow-up with me in about 3-6 months   Lab orders are signed for fasting labwork which can be done prior to the appointment      Vaccines today:    HPV #3 (final)  TDaP (every 10 years)

## 2025-06-17 ENCOUNTER — RESULTS FOLLOW-UP (OUTPATIENT)
Dept: PEDIATRICS | Facility: CLINIC | Age: 20
End: 2025-06-17
Payer: COMMERCIAL

## 2025-06-17 LAB
CREAT UR-MCNC: 110 MG/DL
MICROALBUMIN UR-MCNC: 40.1 MG/L
MICROALBUMIN/CREAT UR: 36.45 MG/G CR (ref 0–17)

## 2025-06-17 NOTE — LETTER
June 19, 2025      Gadiel James  [address] [place] [address]        Dear Michaelsukhjinder,    We are writing to inform you of your test results.  Please send a letter with a copy of results:     Your tests are all complete. The results show:     Your microalbumin level (check for protein in the urine) is just above normal. The level needs to be monitored if it is over 300 mg/gram creatinine and is worrisome if it is over 3000 mg/g cr.  You should have this checked once per year to ensure that your diabetes is not adversely affecting your kidney function.     2.   Your hemoglobin A1C (long-range glucose control) was checked again. This was meant to be drawn in 3 months rather than at your visit. I apologize for the early draw. A new order is in place for your labwork later this summer.         Please feel free to contact me if you have any questions or concerns.        Kvng Mckeon      Resulted Orders   Albumin Random Urine Quantitative with Creat Ratio   Result Value Ref Range    Creatinine Urine mg/dL 110.0 mg/dL      Comment:      The reference ranges have not been established in urine creatinine. The results should be integrated into the clinical context for interpretation.    Albumin Urine mg/L 40.1 mg/L      Comment:      The reference ranges have not been established in urine albumin. The results should be integrated into the clinical context for interpretation.    Albumin Urine mg/g Cr 36.45 (H) 0.00 - 17.00 mg/g Cr      Comment:      Microalbuminuria is defined as an albumin:creatinine ratio of 17 to 299 for males and 25 to 299 for females. A ratio of albumin:creatinine of 300 or higher is indicative of overt proteinuria.  Due to biologic variability, positive results should be confirmed by a second, first-morning random or 24-hour timed urine specimen. If there is discrepancy, a third specimen is recommended. When 2 out of 3 results are in the microalbuminuria range, this is evidence for  incipient nephropathy and warrants increased efforts at glucose control, blood pressure control, and institution of therapy with an angiotensin-converting-enzyme (ACE) inhibitor (if the patient can tolerate it).     Hemoglobin A1c   Result Value Ref Range    Estimated Average Glucose 192 (H) <117 mg/dL    Hemoglobin A1C 8.3 (H) 0.0 - 5.6 %      Comment:      Normal <5.7%   Prediabetes 5.7-6.4%    Diabetes 6.5% or higher     Note: Adopted from ADA consensus guidelines.    Narrative    Results consistent with previous, repeat testing unnecessary    Extra Green Top Tube (LAB USE ONLY)   Result Value Ref Range    Hold Specimen JIC        If you have any questions or concerns, please call the clinic at the number listed above.       Sincerely,      Kvng Mckeon MD    Electronically signed

## 2025-06-19 ENCOUNTER — PATIENT OUTREACH (OUTPATIENT)
Dept: PHARMACY | Facility: OTHER | Age: 20
End: 2025-06-19
Payer: COMMERCIAL

## 2025-06-19 ENCOUNTER — TELEPHONE (OUTPATIENT)
Dept: PEDIATRICS | Facility: CLINIC | Age: 20
End: 2025-06-19
Payer: COMMERCIAL

## 2025-06-19 NOTE — PROGRESS NOTES
[facility] Recruitment: [facility]     Referral outreach attempt #3 on June 19, 2025      Outcome: visit scheduled 7/31/2025    REI Alonzo